# Patient Record
Sex: MALE | Race: WHITE | NOT HISPANIC OR LATINO
[De-identification: names, ages, dates, MRNs, and addresses within clinical notes are randomized per-mention and may not be internally consistent; named-entity substitution may affect disease eponyms.]

---

## 2020-05-28 ENCOUNTER — TRANSCRIPTION ENCOUNTER (OUTPATIENT)
Age: 40
End: 2020-05-28

## 2020-08-19 ENCOUNTER — TRANSCRIPTION ENCOUNTER (OUTPATIENT)
Age: 40
End: 2020-08-19

## 2021-07-30 ENCOUNTER — EMERGENCY (EMERGENCY)
Facility: HOSPITAL | Age: 41
LOS: 1 days | Discharge: HOME | End: 2021-07-30
Attending: EMERGENCY MEDICINE | Admitting: EMERGENCY MEDICINE
Payer: COMMERCIAL

## 2021-07-30 VITALS
SYSTOLIC BLOOD PRESSURE: 104 MMHG | DIASTOLIC BLOOD PRESSURE: 70 MMHG | HEART RATE: 86 BPM | RESPIRATION RATE: 18 BRPM | OXYGEN SATURATION: 100 %

## 2021-07-30 VITALS
OXYGEN SATURATION: 100 % | DIASTOLIC BLOOD PRESSURE: 79 MMHG | SYSTOLIC BLOOD PRESSURE: 117 MMHG | HEART RATE: 107 BPM | TEMPERATURE: 98 F | RESPIRATION RATE: 20 BRPM

## 2021-07-30 LAB
ALBUMIN SERPL ELPH-MCNC: 4.5 G/DL — SIGNIFICANT CHANGE UP (ref 3.3–5)
ALP SERPL-CCNC: 71 U/L — SIGNIFICANT CHANGE UP (ref 40–120)
ALT FLD-CCNC: 6 U/L — SIGNIFICANT CHANGE UP (ref 4–41)
ANION GAP SERPL CALC-SCNC: 14 MMOL/L — SIGNIFICANT CHANGE UP (ref 7–14)
AST SERPL-CCNC: 17 U/L — SIGNIFICANT CHANGE UP (ref 4–40)
BASOPHILS # BLD AUTO: 0.02 K/UL — SIGNIFICANT CHANGE UP (ref 0–0.2)
BASOPHILS NFR BLD AUTO: 0.2 % — SIGNIFICANT CHANGE UP (ref 0–2)
BILIRUB SERPL-MCNC: 0.3 MG/DL — SIGNIFICANT CHANGE UP (ref 0.2–1.2)
BUN SERPL-MCNC: 14 MG/DL — SIGNIFICANT CHANGE UP (ref 7–23)
CALCIUM SERPL-MCNC: 9.3 MG/DL — SIGNIFICANT CHANGE UP (ref 8.4–10.5)
CHLORIDE SERPL-SCNC: 103 MMOL/L — SIGNIFICANT CHANGE UP (ref 98–107)
CO2 SERPL-SCNC: 21 MMOL/L — LOW (ref 22–31)
CREAT SERPL-MCNC: 1.11 MG/DL — SIGNIFICANT CHANGE UP (ref 0.5–1.3)
EOSINOPHIL # BLD AUTO: 0.03 K/UL — SIGNIFICANT CHANGE UP (ref 0–0.5)
EOSINOPHIL NFR BLD AUTO: 0.2 % — SIGNIFICANT CHANGE UP (ref 0–6)
GLUCOSE SERPL-MCNC: 124 MG/DL — HIGH (ref 70–99)
HCT VFR BLD CALC: 43 % — SIGNIFICANT CHANGE UP (ref 39–50)
HGB BLD-MCNC: 14.1 G/DL — SIGNIFICANT CHANGE UP (ref 13–17)
IANC: 11.02 K/UL — HIGH (ref 1.5–8.5)
IMM GRANULOCYTES NFR BLD AUTO: 0.5 % — SIGNIFICANT CHANGE UP (ref 0–1.5)
LIDOCAIN IGE QN: 52 U/L — SIGNIFICANT CHANGE UP (ref 7–60)
LYMPHOCYTES # BLD AUTO: 0.66 K/UL — LOW (ref 1–3.3)
LYMPHOCYTES # BLD AUTO: 5.3 % — LOW (ref 13–44)
MCHC RBC-ENTMCNC: 29.3 PG — SIGNIFICANT CHANGE UP (ref 27–34)
MCHC RBC-ENTMCNC: 32.8 GM/DL — SIGNIFICANT CHANGE UP (ref 32–36)
MCV RBC AUTO: 89.2 FL — SIGNIFICANT CHANGE UP (ref 80–100)
MONOCYTES # BLD AUTO: 0.73 K/UL — SIGNIFICANT CHANGE UP (ref 0–0.9)
MONOCYTES NFR BLD AUTO: 5.8 % — SIGNIFICANT CHANGE UP (ref 2–14)
NEUTROPHILS # BLD AUTO: 11.02 K/UL — HIGH (ref 1.8–7.4)
NEUTROPHILS NFR BLD AUTO: 88 % — HIGH (ref 43–77)
NRBC # BLD: 0 /100 WBCS — SIGNIFICANT CHANGE UP
NRBC # FLD: 0 K/UL — SIGNIFICANT CHANGE UP
PLATELET # BLD AUTO: 196 K/UL — SIGNIFICANT CHANGE UP (ref 150–400)
POTASSIUM SERPL-MCNC: 4.3 MMOL/L — SIGNIFICANT CHANGE UP (ref 3.5–5.3)
POTASSIUM SERPL-SCNC: 4.3 MMOL/L — SIGNIFICANT CHANGE UP (ref 3.5–5.3)
PROT SERPL-MCNC: 6.8 G/DL — SIGNIFICANT CHANGE UP (ref 6–8.3)
RBC # BLD: 4.82 M/UL — SIGNIFICANT CHANGE UP (ref 4.2–5.8)
RBC # FLD: 12.7 % — SIGNIFICANT CHANGE UP (ref 10.3–14.5)
SODIUM SERPL-SCNC: 138 MMOL/L — SIGNIFICANT CHANGE UP (ref 135–145)
WBC # BLD: 12.52 K/UL — HIGH (ref 3.8–10.5)
WBC # FLD AUTO: 12.52 K/UL — HIGH (ref 3.8–10.5)

## 2021-07-30 PROCEDURE — 71045 X-RAY EXAM CHEST 1 VIEW: CPT | Mod: 26

## 2021-07-30 PROCEDURE — 99284 EMERGENCY DEPT VISIT MOD MDM: CPT

## 2021-07-30 RX ORDER — MORPHINE SULFATE 50 MG/1
4 CAPSULE, EXTENDED RELEASE ORAL ONCE
Refills: 0 | Status: DISCONTINUED | OUTPATIENT
Start: 2021-07-30 | End: 2021-07-30

## 2021-07-30 RX ORDER — SODIUM CHLORIDE 9 MG/ML
1000 INJECTION INTRAMUSCULAR; INTRAVENOUS; SUBCUTANEOUS ONCE
Refills: 0 | Status: COMPLETED | OUTPATIENT
Start: 2021-07-30 | End: 2021-07-30

## 2021-07-30 RX ORDER — FAMOTIDINE 10 MG/ML
20 INJECTION INTRAVENOUS ONCE
Refills: 0 | Status: COMPLETED | OUTPATIENT
Start: 2021-07-30 | End: 2021-07-30

## 2021-07-30 RX ORDER — KETOROLAC TROMETHAMINE 30 MG/ML
15 SYRINGE (ML) INJECTION ONCE
Refills: 0 | Status: DISCONTINUED | OUTPATIENT
Start: 2021-07-30 | End: 2021-07-30

## 2021-07-30 RX ADMIN — SODIUM CHLORIDE 1000 MILLILITER(S): 9 INJECTION INTRAMUSCULAR; INTRAVENOUS; SUBCUTANEOUS at 06:51

## 2021-07-30 RX ADMIN — FAMOTIDINE 20 MILLIGRAM(S): 10 INJECTION INTRAVENOUS at 06:51

## 2021-07-30 RX ADMIN — Medication 15 MILLIGRAM(S): at 07:03

## 2021-07-30 NOTE — ED ADULT NURSE NOTE - NSHOSCREENINGQ1_ED_ALL_ED
No Spoke with jazmine re: increase in medication & 6-1-20 Appt.  Jazmine will try to get in touch with the pt.    Sent pt a msg via portal also.

## 2021-07-30 NOTE — ED PROVIDER NOTE - CLINICAL SUMMARY MEDICAL DECISION MAKING FREE TEXT BOX
40M presenting with sharp abdominal pain. On exam, tachy and uncomfortable appearing. epigastric/luq pain. Possible onset of gastroenteritis, will assess for other abdominal pathology on labs and upright CXR. Plan: Labs, imaging, urine, reassess. Leidy Mobley, VALERY PGY3

## 2021-07-30 NOTE — ED PROVIDER NOTE - NS ED ROS FT
CONST: no fevers, no chills, no trauma  EYES: no pain, no blurry vision   ENT: no sore throat, no epistaxis, no rhinorrhea  CV: no chest pain, no palpitations, no orthopnea, no extremity pain or swelling  RESP: no shortness of breath, no cough, no sputum, no pleurisy, no wheezing  ABD: + abdominal pain, + nausea, +vomiting, + diarrhea, no black or bloody stool  : no dysuria, no hematuria, no frequency, no urgency  MSK: no back pain, no neck pain, no extremity pain  NEURO: no headache, no sensory disturbances, no focal weakness, no dizziness  HEME: no easy bleeding or bruising  SKIN: no diaphoresis, no rash

## 2021-07-30 NOTE — ED ADULT NURSE NOTE - OBJECTIVE STATEMENT
41yo male received in trauma room B. pt A&Ox3, denies pmhx, c/o pain to mid-abdomen since 10pm last night. last po intake 9pm. Abdomen soft, non-distended. Last BM yesterday morning. Denies nauseas, vomiting, and diarrhea at present. Denies hemauria and dysuria. Respiration even and non-labored. in nad. Side rails up, bed at lowest position, call bell within reach, patient oriented to the unit, safety maintained. MD anand in progress.

## 2021-07-30 NOTE — ED ADULT NURSE REASSESSMENT NOTE - NS ED NURSE REASSESS COMMENT FT1
Report received night RN. Pt states he is feeling much better. Denies abd pain, nausea, SOB, CP, dizziness. Will continue to monitor.

## 2021-07-30 NOTE — ED PROVIDER NOTE - OBJECTIVE STATEMENT
40M with no significant pmhx presenting with CC of periumbilical abdominal pain that started last night after eating food (schwarma) from a truck. Pain is sharp/"shooting" and intermittent since. +loose stools and nausea vomiting. Had pain like this about one month ago, unsure what triggered it but resolved on its own. Denies flank pain, hx of kidney stones, urinary symptoms, testicular pain. No hx of abdominal surgeries.

## 2021-07-30 NOTE — ED ADULT TRIAGE NOTE - CHIEF COMPLAINT QUOTE
Mid abdominal pain all night. vomited once this morning. Denies fever or diarrhea. Pt appears uncomfortable. No PMH

## 2021-07-30 NOTE — ED PROVIDER NOTE - PHYSICAL EXAMINATION
Const: Well-nourished, Well-developed, appearing stated age.  Eyes: no conjunctival injection, and symmetrical lids.  HEENT: Head NCAT, no lesions. Atraumatic external nose and ears. Moist MM.  Neck: Symmetric, trachea midline.   CVS: +S1/S2, Peripheral pulses 2+ and equal in all extremities.  RESP: Unlabored respiratory effort. Clear to auscultation bilaterally.  GI: Minimally tender in LUQ and epigastric area, rest of abdomen is soft Nontender/Nondistended, No CVA tenderness b/l.   MSK: Normocephalic/Atraumatic, Lower Extremities w/o calf tenderness or edema b/l.   Skin: Warm, dry and intact.   Neuro: CNs II-XII grossly intact. Motor & Sensation grossly intact.  Psych: Awake, Alert, & Oriented (AAO) x3. Appropriate mood and affect.

## 2021-07-30 NOTE — ED PROVIDER NOTE - PATIENT PORTAL LINK FT
You can access the FollowMyHealth Patient Portal offered by [facility] by registering at the following website: [email or web address]. By joining [facility]’s FollowMyHealth portal, you will also be able to view your health information using other applications (apps) compatible with our system.

## 2021-07-30 NOTE — ED PROVIDER NOTE - PROGRESS NOTE DETAILS
reports improvement. VS WNL. Reports improvement. Dr. Renee: Pt was signed out to me for re-eval given pt has improvement in symptoms. Currently pain free. No tenderness on exam. Tolerating PO. Given strict return precautions.

## 2021-07-30 NOTE — ED PROVIDER NOTE - NSFOLLOWUPINSTRUCTIONS_ED_ALL_ED_FT
You were seen in the ED for abdominal pain.   The following labs/imaging were obtained: see attached (if applicable)  Continue home medications (if any).   Take omeprazole 20mg OTC 1 hr prior to first meal of the day for 14 days.   Return to the ED if you develop fever, chest pain, shortness of breath,  worsening or new concerning symptoms.  Follow up with your primary care in 2-3 days.   Discussed with pt results of work up, strict return precautions, and need for follow up.  Pt expressed understanding and agrees with plan.

## 2021-07-31 ENCOUNTER — EMERGENCY (EMERGENCY)
Facility: HOSPITAL | Age: 41
LOS: 1 days | Discharge: HOME | End: 2021-07-31
Admitting: EMERGENCY MEDICINE
Payer: COMMERCIAL

## 2021-07-31 VITALS
RESPIRATION RATE: 18 BRPM | DIASTOLIC BLOOD PRESSURE: 59 MMHG | SYSTOLIC BLOOD PRESSURE: 110 MMHG | OXYGEN SATURATION: 100 % | HEART RATE: 88 BPM | TEMPERATURE: 98 F

## 2021-07-31 PROCEDURE — 74177 CT ABD & PELVIS W/CONTRAST: CPT | Mod: 26

## 2021-07-31 PROCEDURE — 99285 EMERGENCY DEPT VISIT HI MDM: CPT

## 2021-07-31 RX ORDER — FAMOTIDINE 10 MG/ML
20 INJECTION INTRAVENOUS ONCE
Refills: 0 | Status: COMPLETED | OUTPATIENT
Start: 2021-07-31 | End: 2021-07-31

## 2021-07-31 RX ORDER — SODIUM CHLORIDE 9 MG/ML
1000 INJECTION INTRAMUSCULAR; INTRAVENOUS; SUBCUTANEOUS ONCE
Refills: 0 | Status: COMPLETED | OUTPATIENT
Start: 2021-07-31 | End: 2021-07-31

## 2021-07-31 RX ADMIN — SODIUM CHLORIDE 1000 MILLILITER(S): 9 INJECTION INTRAMUSCULAR; INTRAVENOUS; SUBCUTANEOUS at 23:51

## 2021-07-31 RX ADMIN — FAMOTIDINE 20 MILLIGRAM(S): 10 INJECTION INTRAVENOUS at 23:51

## 2021-07-31 NOTE — ED ADULT NURSE NOTE - OBJECTIVE STATEMENT
Received patient to intake for abdominal pain. A&o4,ambulatory, c/o abdominal pain x2 days with diarrhea and vomiting. Abdomen soft nondistended, not actively vomiting at this time. RR even and unlabored, left 20G AC placed, labs sent.

## 2021-07-31 NOTE — ED PROVIDER NOTE - CLINICAL SUMMARY MEDICAL DECISION MAKING FREE TEXT BOX
this is a 40 y.o male with no PMhx presented to the ED complaining of having periumbilical pain for the past 48 hours. Patient states that the pain started thursday night, sharp in nature and intermittent. abdominal pain- r/o appendicitis-labs, fluids, meds reassess

## 2021-07-31 NOTE — ED ADULT NURSE NOTE - NSIMPLEMENTINTERV_GEN_ALL_ED
Implemented All Universal Safety Interventions:  [facility] to call system. Call bell, personal items and telephone within reach. Instruct patient to call for assistance. Room bathroom lighting operational. Non-slip footwear when patient is off stretcher. Physically safe environment: no spills, clutter or unnecessary equipment. Stretcher in lowest position, wheels locked, appropriate side rails in place.

## 2021-07-31 NOTE — ED PROVIDER NOTE - PATIENT PORTAL LINK FT
You can access the FollowMyHealth Patient Portal offered by [facility] by registering at the following website: [email or web address]. By joining [facility]’s FollowMyHealth portal, you will also be able to view your health information using other applications (apps) compatible with our system.

## 2021-07-31 NOTE — ED PROVIDER NOTE - OBJECTIVE STATEMENT
this is a 40 y.o male with no PMhx presented to the ED complaining of having periumbilical pain for the past 48 hours. Patient states that the pain started thursday night, sharp in nature and intermittent. patient states that ibuprofen improves the pain, he admits to having a episode of vomiting, and 1 episode of diarrhea. patient was here yesterday however did not have a CT scan, and was given medication which made the pain worse. Patient denies having any CP, SOB, JERNIGAN, headaches, dizziness, fever, or chills. patient states that he hasn't been able to eat much.

## 2021-07-31 NOTE — ED ADULT TRIAGE NOTE - CHIEF COMPLAINT QUOTE
abd pain    pt was seen 2 days ago for same complaint... generalized abd pain, vomiting x1, diarrhea x2.  states was feeling better but now worse again.

## 2021-08-01 VITALS
DIASTOLIC BLOOD PRESSURE: 53 MMHG | OXYGEN SATURATION: 100 % | RESPIRATION RATE: 16 BRPM | SYSTOLIC BLOOD PRESSURE: 115 MMHG | TEMPERATURE: 98 F | HEART RATE: 67 BPM

## 2021-08-01 LAB
ALBUMIN SERPL ELPH-MCNC: 3.9 G/DL — SIGNIFICANT CHANGE UP (ref 3.3–5)
ALP SERPL-CCNC: 60 U/L — SIGNIFICANT CHANGE UP (ref 40–120)
ALT FLD-CCNC: 7 U/L — SIGNIFICANT CHANGE UP (ref 4–41)
ANION GAP SERPL CALC-SCNC: 13 MMOL/L — SIGNIFICANT CHANGE UP (ref 7–14)
AST SERPL-CCNC: 20 U/L — SIGNIFICANT CHANGE UP (ref 4–40)
BASOPHILS # BLD AUTO: 0.02 K/UL — SIGNIFICANT CHANGE UP (ref 0–0.2)
BASOPHILS NFR BLD AUTO: 0.2 % — SIGNIFICANT CHANGE UP (ref 0–2)
BILIRUB SERPL-MCNC: 0.3 MG/DL — SIGNIFICANT CHANGE UP (ref 0.2–1.2)
BUN SERPL-MCNC: 10 MG/DL — SIGNIFICANT CHANGE UP (ref 7–23)
CALCIUM SERPL-MCNC: 8.9 MG/DL — SIGNIFICANT CHANGE UP (ref 8.4–10.5)
CHLORIDE SERPL-SCNC: 104 MMOL/L — SIGNIFICANT CHANGE UP (ref 98–107)
CO2 SERPL-SCNC: 21 MMOL/L — LOW (ref 22–31)
CREAT SERPL-MCNC: 0.89 MG/DL — SIGNIFICANT CHANGE UP (ref 0.5–1.3)
EOSINOPHIL # BLD AUTO: 0.04 K/UL — SIGNIFICANT CHANGE UP (ref 0–0.5)
EOSINOPHIL NFR BLD AUTO: 0.5 % — SIGNIFICANT CHANGE UP (ref 0–6)
GLUCOSE SERPL-MCNC: 113 MG/DL — HIGH (ref 70–99)
HCT VFR BLD CALC: 39.4 % — SIGNIFICANT CHANGE UP (ref 39–50)
HGB BLD-MCNC: 13 G/DL — SIGNIFICANT CHANGE UP (ref 13–17)
IANC: 6.18 K/UL — SIGNIFICANT CHANGE UP (ref 1.5–8.5)
IMM GRANULOCYTES NFR BLD AUTO: 0.1 % — SIGNIFICANT CHANGE UP (ref 0–1.5)
LYMPHOCYTES # BLD AUTO: 1.14 K/UL — SIGNIFICANT CHANGE UP (ref 1–3.3)
LYMPHOCYTES # BLD AUTO: 13.6 % — SIGNIFICANT CHANGE UP (ref 13–44)
MCHC RBC-ENTMCNC: 29.5 PG — SIGNIFICANT CHANGE UP (ref 27–34)
MCHC RBC-ENTMCNC: 33 GM/DL — SIGNIFICANT CHANGE UP (ref 32–36)
MCV RBC AUTO: 89.3 FL — SIGNIFICANT CHANGE UP (ref 80–100)
MONOCYTES # BLD AUTO: 0.98 K/UL — HIGH (ref 0–0.9)
MONOCYTES NFR BLD AUTO: 11.7 % — SIGNIFICANT CHANGE UP (ref 2–14)
NEUTROPHILS # BLD AUTO: 6.18 K/UL — SIGNIFICANT CHANGE UP (ref 1.8–7.4)
NEUTROPHILS NFR BLD AUTO: 73.9 % — SIGNIFICANT CHANGE UP (ref 43–77)
NRBC # BLD: 0 /100 WBCS — SIGNIFICANT CHANGE UP
NRBC # FLD: 0 K/UL — SIGNIFICANT CHANGE UP
PLATELET # BLD AUTO: 192 K/UL — SIGNIFICANT CHANGE UP (ref 150–400)
POTASSIUM SERPL-MCNC: 4 MMOL/L — SIGNIFICANT CHANGE UP (ref 3.5–5.3)
POTASSIUM SERPL-SCNC: 4 MMOL/L — SIGNIFICANT CHANGE UP (ref 3.5–5.3)
PROT SERPL-MCNC: 6.7 G/DL — SIGNIFICANT CHANGE UP (ref 6–8.3)
RBC # BLD: 4.41 M/UL — SIGNIFICANT CHANGE UP (ref 4.2–5.8)
RBC # FLD: 12.7 % — SIGNIFICANT CHANGE UP (ref 10.3–14.5)
SODIUM SERPL-SCNC: 138 MMOL/L — SIGNIFICANT CHANGE UP (ref 135–145)
WBC # BLD: 8.37 K/UL — SIGNIFICANT CHANGE UP (ref 3.8–10.5)
WBC # FLD AUTO: 8.37 K/UL — SIGNIFICANT CHANGE UP (ref 3.8–10.5)

## 2021-08-01 RX ADMIN — Medication 1 TABLET(S): at 00:39

## 2022-01-08 ENCOUNTER — EMERGENCY (EMERGENCY)
Facility: HOSPITAL | Age: 42
LOS: 1 days | Discharge: HOME | End: 2022-01-08
Attending: EMERGENCY MEDICINE
Payer: COMMERCIAL

## 2022-01-08 VITALS
HEART RATE: 102 BPM | RESPIRATION RATE: 17 BRPM | OXYGEN SATURATION: 97 % | SYSTOLIC BLOOD PRESSURE: 127 MMHG | WEIGHT: 190.04 LBS | DIASTOLIC BLOOD PRESSURE: 83 MMHG | TEMPERATURE: 99 F

## 2022-01-08 VITALS
OXYGEN SATURATION: 99 % | TEMPERATURE: 98 F | SYSTOLIC BLOOD PRESSURE: 126 MMHG | RESPIRATION RATE: 16 BRPM | HEART RATE: 86 BPM | DIASTOLIC BLOOD PRESSURE: 84 MMHG

## 2022-01-08 PROCEDURE — 29515 APPLICATION SHORT LEG SPLINT: CPT | Mod: LT

## 2022-01-08 PROCEDURE — 29515 APPLICATION SHORT LEG SPLINT: CPT

## 2022-01-08 PROCEDURE — 73590 X-RAY EXAM OF LOWER LEG: CPT | Mod: 26,77,LT

## 2022-01-08 PROCEDURE — 73600 X-RAY EXAM OF ANKLE: CPT

## 2022-01-08 PROCEDURE — 73610 X-RAY EXAM OF ANKLE: CPT | Mod: 26,LT,76

## 2022-01-08 PROCEDURE — 73620 X-RAY EXAM OF FOOT: CPT | Mod: 26,LT

## 2022-01-08 PROCEDURE — 73590 X-RAY EXAM OF LOWER LEG: CPT | Mod: 26,LT

## 2022-01-08 PROCEDURE — 99284 EMERGENCY DEPT VISIT MOD MDM: CPT | Mod: 25

## 2022-01-08 PROCEDURE — 73610 X-RAY EXAM OF ANKLE: CPT

## 2022-01-08 PROCEDURE — 73590 X-RAY EXAM OF LOWER LEG: CPT

## 2022-01-08 PROCEDURE — 73620 X-RAY EXAM OF FOOT: CPT

## 2022-01-08 NOTE — ED PROVIDER NOTE - PROGRESS NOTE DETAILS
Cory pgy1: distal fibula fx seen on xray will splint with ortho f.u Isidro: Patient seen by ortho for stress views of distal fibula. shows widening of space. ortho splinted. will repeat post splint xray, d/c home to f/u outpatient.

## 2022-01-08 NOTE — ED ADULT NURSE NOTE - OBJECTIVE STATEMENT
42 yo M pt p/w simple mechanical slip and fall on ice today. pt c/o L ankle pain and swelling as well as L knee pain. pt is A&Ox4, MAEW, L ankle ROM limited D/T pain, L ankle minimally swollen, DP pulses intact and equal b/l, L knee FROM, no erythema noted to L ankle/knee, overlying skin intact.  pt endorses walking on his foot for the entire day after injury but pain worsened over the past few hours.  Pt denies: trauma other than stated, syncope, LOC, headache, dizziness, chest pain, palpitations, cough, SOB, abdominal pain, n/v/d, urinary symptoms, fevers, chills, weakness at this time.

## 2022-01-08 NOTE — ED PROVIDER NOTE - NSFOLLOWUPINSTRUCTIONS_ED_ALL_ED_FT
Follow up with Orthopedist Dr. Lyons.  Text him.   Do not wet splint. Keep the splint elevated.   Take ibuprofen 600 mg by mouth every 6 hours as needed for pain.   Return to the ER immediately for worsening symptoms.

## 2022-01-08 NOTE — ED PROVIDER NOTE - CLINICAL SUMMARY MEDICAL DECISION MAKING FREE TEXT BOX
40 yo m no pmhx vss distal pulses intact <2 s cap refill swelling and tenderness to later aspect of distal leg no malleoli tenderness no midfoot tenderness. Concern for fx vs sprain vs dislocation will get x ray and give analgesia. 42 yo m no pmhx vss distal pulses intact <2 s cap refill swelling and tenderness to later aspect of distal leg no malleoli tenderness no midfoot tenderness. Concern for fx vs sprain vs dislocation will get x ray and give analgesia.    Isidro: 41 year old male with left distal fibula pain and swelling. + ttp, + swelling. no knee tenderness, no knee swelling. FROM of left knee. + 2 dp b/ lle. skin intact.

## 2022-01-08 NOTE — ED PROVIDER NOTE - PHYSICAL EXAMINATION
Gen: NAD, non-toxic appearing  Head: normal appearing  HEENT: normal conjunctiva, oral mucosa moist  Lung: no respiratory distress, speaking in full sentences, CTA b/l     CV: regular rate and rhythm, no murmurs distal pulses intact <2 s cap refill  Abd: soft, non distended, non tender   MSK: no visible deformities swelling and tenderness to later aspect of distal leg no malleoli tenderness no midfoot tenderness.    Neuro: No focal deficits, AAOx3 full sensation all ext  Skin: Warm  Psych: normal affect

## 2022-01-08 NOTE — ED PROVIDER NOTE - OBJECTIVE STATEMENT
42 yo M p/w ankle pain s/p fall. Pt was walking to get mail down drive way slipped on ice twisting ankle. Fell on hit buttocks. Did not hit head did not have loc. not on blood thinners. Was unable to ambulate after incident. Unable to bare weight. Denies loss of sensation.

## 2022-01-08 NOTE — ED PROVIDER NOTE - PATIENT PORTAL LINK FT
You can access the FollowMyHealth Patient Portal offered by [facility] by registering at the following website: [email or web address]. By joining [employer]’s FollowMyHealth portal, you will also be able to view your health information using other applications (apps) compatible with our system.

## 2022-01-09 NOTE — CONSULT NOTE ADULT - SUBJECTIVE AND OBJECTIVE BOX
41yMale c/o left ankle pain s/p mechanical fall. Denies headstrike/LOC. Denies any other trauma or injuries. Denies numbness/tingling. Community ambulator at baseline.    PAST MEDICAL & SURGICAL HISTORY:  No pertinent past medical history    No significant past surgical history      Home Medications:    Allergies    No Known Allergies    Intolerances        Vital Signs Last 24 Hrs  T(C): 36.6 (08 Jan 2022 22:30), Max: 37.1 (08 Jan 2022 18:32)  T(F): 97.8 (08 Jan 2022 22:30), Max: 98.8 (08 Jan 2022 18:32)  HR: 86 (08 Jan 2022 22:30) (86 - 102)  BP: 126/84 (08 Jan 2022 22:30) (126/84 - 127/83)  BP(mean): --  RR: 16 (08 Jan 2022 22:30) (16 - 17)  SpO2: 99% (08 Jan 2022 22:30) (97% - 99%)    Physical Exam  Gen: NAD, resting comfortably  LLE  Skin intact  TTP over ankle  ROM limited due to pain  +TA/EHL/FHL/GS  SILT L2-S1  DP/PT 2+  Compartments soft and compressible    RUE: No bony tenderness or deformity, skin intact  LUE: No bony tenderness or deformity, skin intact  RLE: No bony tenderness or deformity, skin intact  Spine: No tenderness or stepoffs, skin intact                    Imaging:  XR L Ankle: Showing Hitchcock B ankle fracture  Stress View Ankle: pos medial clear space widening    Procedure: Patient advised of need for closed reduction of fracture, patient verbalized understanding and consented to the procedure. Patient neurovascularly intact pre-procedure. Closed reduction performed followed by placement of a well padded trilam splint. Patient tolerated the procedure well. Post procedure imaging obtained and showed improved alignment. Post procedure the patient was NV intact.    A/P: 41yMale with L ankle fracture s/p closed reduction and splinting    - Pain control  - NWB L LE in splint  - Keep splint clean, dry and intact until follow up  - Rest ice elevate  - [id number] daily  - Follow up with Dr. Lyons in office, please call for appointment tomorrow  - all questions answered

## 2022-01-10 ENCOUNTER — APPOINTMENT (OUTPATIENT)
Dept: ORTHOPEDIC SURGERY | Facility: CLINIC | Age: 42
End: 2022-01-10
Payer: COMMERCIAL

## 2022-01-10 ENCOUNTER — NON-APPOINTMENT (OUTPATIENT)
Age: 42
End: 2022-01-10

## 2022-01-10 DIAGNOSIS — S82.892A OTHER FRACTURE OF LEFT LOWER LEG, INITIAL ENCOUNTER FOR CLOSED FRACTURE: ICD-10-CM

## 2022-01-10 PROBLEM — Z00.00 ENCOUNTER FOR PREVENTIVE HEALTH EXAMINATION: Status: ACTIVE | Noted: 2022-01-10

## 2022-01-10 PROCEDURE — 99442: CPT

## 2022-01-12 ENCOUNTER — TRANSCRIPTION ENCOUNTER (OUTPATIENT)
Age: 42
End: 2022-01-12

## 2022-05-25 NOTE — ED PROVIDER NOTE - NS_EDPROVIDERDISPOUSERTYPE_ED_A_ED
Tony Zaragoza,   Will you take a look at this clinic encounter and call me at [phone] to discuss.  Thank you,   ~Nataliya 
I have personally evaluated and examined the patient. The Attending was available to me as a supervising provider if needed.

## 2023-05-02 NOTE — ED ADULT NURSE NOTE - NSHOSCREENINGQ1_ED_ALL_ED
No Double Z Plasty Text: The lesion was extirpated to the level of the fat with a #15 scalpel blade. Given the location of the defect, shape of the defect and the proximity to free margins a double Z-plasty was deemed most appropriate for repair. Using a sterile surgical marker, the appropriate transposition arms of the double Z-plasty were drawn incorporating the defect and placing the expected incisions within the relaxed skin tension lines where possible. The area thus outlined was incised deep to adipose tissue with a #15 scalpel blade. The skin margins were undermined to an appropriate distance in all directions utilizing iris scissors. The opposing transposition arms were then transposed and carried over into place in opposite direction and anchored with interrupted buried subcutaneous sutures.

## 2023-10-01 NOTE — ED ADULT NURSE NOTE - DATE OF LAST VACCINATION
Medicaid cab for pt @ 03:30 with an ETA of 1-3 hours. Ride # [phone].     [employer] medicaid; [phone]     Dayna Taveras RN  10/01/23 4535 30-Mar-2021

## 2024-02-09 ENCOUNTER — APPOINTMENT (OUTPATIENT)
Dept: RADIOLOGY | Facility: HOSPITAL | Age: 44
End: 2024-02-09
Payer: COMMERCIAL

## 2024-02-09 ENCOUNTER — APPOINTMENT (OUTPATIENT)
Dept: PULMONOLOGY | Facility: CLINIC | Age: 44
End: 2024-02-09
Payer: COMMERCIAL

## 2024-02-09 ENCOUNTER — OUTPATIENT (OUTPATIENT)
Dept: OUTPATIENT SERVICES | Facility: HOSPITAL | Age: 44
LOS: 1 days | End: 2024-02-09
Payer: COMMERCIAL

## 2024-02-09 VITALS
BODY MASS INDEX: 24.38 KG/M2 | HEIGHT: 72 IN | TEMPERATURE: 97.5 F | HEART RATE: 66 BPM | DIASTOLIC BLOOD PRESSURE: 70 MMHG | OXYGEN SATURATION: 100 % | SYSTOLIC BLOOD PRESSURE: 120 MMHG | WEIGHT: 180 LBS

## 2024-02-09 DIAGNOSIS — Z84.89 FAMILY HISTORY OF OTHER SPECIFIED CONDITIONS: ICD-10-CM

## 2024-02-09 DIAGNOSIS — K42.9 UMBILICAL HERNIA W/OUT OBSTRUCTION OR GANGRENE: ICD-10-CM

## 2024-02-09 DIAGNOSIS — Z83.511 FAMILY HISTORY OF GLAUCOMA: ICD-10-CM

## 2024-02-09 DIAGNOSIS — J45.909 UNSPECIFIED ASTHMA, UNCOMPLICATED: ICD-10-CM

## 2024-02-09 DIAGNOSIS — K40.20 BILATERAL INGUINAL HERNIA, W/OUT OBSTRUCTION OR GANGRENE, NOT SPECIFIED AS RECURRENT: ICD-10-CM

## 2024-02-09 DIAGNOSIS — Z78.9 OTHER SPECIFIED HEALTH STATUS: ICD-10-CM

## 2024-02-09 DIAGNOSIS — K52.9 NONINFECTIVE GASTROENTERITIS AND COLITIS, UNSPECIFIED: ICD-10-CM

## 2024-02-09 DIAGNOSIS — R94.2 ABNORMAL RESULTS OF PULMONARY FUNCTION STUDIES: ICD-10-CM

## 2024-02-09 PROCEDURE — 99204 OFFICE O/P NEW MOD 45 MIN: CPT

## 2024-02-09 PROCEDURE — 71046 X-RAY EXAM CHEST 2 VIEWS: CPT | Mod: 26

## 2024-02-09 PROCEDURE — 71046 X-RAY EXAM CHEST 2 VIEWS: CPT

## 2024-02-09 RX ORDER — ALBUTEROL SULFATE 90 UG/1
108 (90 BASE) AEROSOL, METERED RESPIRATORY (INHALATION)
Qty: 1 | Refills: 6 | Status: ACTIVE | COMMUNITY
Start: 2024-02-09 | End: 1900-01-01

## 2024-02-09 NOTE — ASSESSMENT
[FreeTextEntry1] : 44y/o  male never smoker  1-  abnormal PFT with  mild restrictive ventilatory defect with  h/o COVID  wheezing 2- eczema hx  /deviated septum /     ?  asthma 3- vaccination per primary   recommendations 1- ENT referral  for septum / inspection  2- cxr  performed today  normal    will  get  HRCT    3- albuterol MDI  prior to exercise   2 inh  q  6hour prn  4- FENO    and  PFT full  lung volumes  return after above counseling and agreement on plan

## 2024-02-09 NOTE — REVIEW OF SYSTEMS
[Fever] : no fever [Fatigue] : no fatigue [Recent Wt Gain (___ Lbs)] : ~T no recent weight gain [Chills] : no chills [Poor Appetite] : no poor appetite [Recent Wt Loss (___ Lbs)] : ~T no recent weight loss [Dry Eyes] : no dry eyes [Epistaxis] : no epistaxis [Sore Throat] : no sore throat [Postnasal Drip] : no postnasal drip [Dry Mouth] : no dry mouth [Sinus Problems] : no sinus problems [Cough] : no cough [Hemoptysis] : no hemoptysis [Sputum] : no sputum [Dyspnea] : no dyspnea [Wheezing] : no wheezing [SOB on Exertion] : no sob on exertion [Chest Discomfort] : no chest discomfort [Palpitations] : no palpitations [GERD] : no gerd [Abdominal Pain] : no abdominal pain [Nausea] : no nausea [Vomiting] : no vomiting [Dysphagia] : no dysphagia [Bleeding] : no bleeding [Rash] : no rash [Blood Transfusion] : no blood transfusion [Clotting Disorder/ Frequent bleeding] : no clotting disorder/ frequent bleeding [Diabetes] : no diabetes [Thyroid Problem] : no thyroid problem [Obesity] : no obesity [TextBox_101] : eczema

## 2024-02-09 NOTE — HISTORY OF PRESENT ILLNESS
[Never] : never [TextBox_4] : 2/9/2024 42y/o male  born in [place]   -  never smoker  work    investment  banker  -   no fumes exposure  -   first  visit for abnormal PFT / wheezing - had   preventative   health  spirometry    in chart   with mild restriction  no full  lung volumes performed - h/o eczema - has noted wheezing when  exercising    and  playing hockey at times - no chest pain  no    sob -doing well otherwise -was told  has a deviated septum  -no  nasal issues  currently  -

## 2024-02-09 NOTE — PHYSICAL EXAM
[Low Lying Soft Palate] : low lying soft palate [III] : Mallampati Class: III [Normal Appearance] : normal appearance [Supple] : supple [No Neck Mass] : no neck mass [No JVD] : no jvd [Normal Rate/Rhythm] : normal rate/rhythm [Normal S1, S2] : normal s1, s2 [No Murmurs] : no murmurs [No Resp Distress] : no resp distress [No Acc Muscle Use] : no acc muscle use [Clear to Auscultation Bilaterally] : clear to auscultation bilaterally [Benign] : benign [Not Tender] : not tender [Normal Gait] : normal gait [No Clubbing] : no clubbing [No Edema] : no edema [No Motor Deficits] : no motor deficits [Normal Affect] : normal affect [TextBox_2] : pleasant male no distress speaking full sentences no cough  [TextBox_11] : crowded no  lesion    moist  [TextBox_125] : wrist   area of eczema

## 2024-02-09 NOTE — PROCEDURE
[FreeTextEntry1] : chart reviewed  1/24  spirometry only      FVC 5.6L (70%)     FEV1 4.28L( 77%)      R normal    fef 25-75% normal   cxr 2021 portable  poor insp effort  read as normal  ct abd 2021 for   abd pain after travel  lung  bases clear   hernia noted    colitis

## 2024-02-23 ENCOUNTER — OUTPATIENT (OUTPATIENT)
Dept: OUTPATIENT SERVICES | Facility: HOSPITAL | Age: 44
LOS: 1 days | End: 2024-02-23
Payer: COMMERCIAL

## 2024-02-23 ENCOUNTER — APPOINTMENT (OUTPATIENT)
Dept: CT IMAGING | Facility: CLINIC | Age: 44
End: 2024-02-23
Payer: COMMERCIAL

## 2024-02-23 DIAGNOSIS — Z86.16 PERSONAL HISTORY OF COVID-19: ICD-10-CM

## 2024-02-23 DIAGNOSIS — J45.909 UNSPECIFIED ASTHMA, UNCOMPLICATED: ICD-10-CM

## 2024-02-23 DIAGNOSIS — R94.2 ABNORMAL RESULTS OF PULMONARY FUNCTION STUDIES: ICD-10-CM

## 2024-02-23 PROCEDURE — 71250 CT THORAX DX C-: CPT

## 2024-02-23 PROCEDURE — 71250 CT THORAX DX C-: CPT | Mod: 26

## 2024-03-04 DIAGNOSIS — R91.1 SOLITARY PULMONARY NODULE: ICD-10-CM

## 2024-03-08 ENCOUNTER — APPOINTMENT (OUTPATIENT)
Dept: PULMONOLOGY | Facility: CLINIC | Age: 44
End: 2024-03-08
Payer: COMMERCIAL

## 2024-03-08 PROCEDURE — 94010 BREATHING CAPACITY TEST: CPT

## 2024-03-08 PROCEDURE — 94729 DIFFUSING CAPACITY: CPT

## 2024-03-08 PROCEDURE — 95012 NITRIC OXIDE EXP GAS DETER: CPT

## 2024-03-08 PROCEDURE — 94726 PLETHYSMOGRAPHY LUNG VOLUMES: CPT

## 2024-03-20 ENCOUNTER — APPOINTMENT (OUTPATIENT)
Dept: ENDOCRINOLOGY | Facility: CLINIC | Age: 44
End: 2024-03-20
Payer: COMMERCIAL

## 2024-03-20 VITALS
RESPIRATION RATE: 17 BRPM | DIASTOLIC BLOOD PRESSURE: 68 MMHG | WEIGHT: 180 LBS | BODY MASS INDEX: 24.38 KG/M2 | HEART RATE: 48 BPM | OXYGEN SATURATION: 100 % | SYSTOLIC BLOOD PRESSURE: 120 MMHG | HEIGHT: 72 IN | TEMPERATURE: 98 F

## 2024-03-20 PROCEDURE — 99204 OFFICE O/P NEW MOD 45 MIN: CPT

## 2024-03-20 NOTE — ASSESSMENT
[FreeTextEntry1] : The patient has prediabetes and is thin so I ordered labs to determine if he has type 1 DM (presymptomatic- Stage 2).  I also ordered a thyroid US to better evaluate the patient's thyroid nodule.  Plan: 1. Thyroid US 2. Labs to be done today at [address] [place] [address]  - see below 3. Follow up 1 week after thyroid US done to review results - telehealth visit ok

## 2024-03-20 NOTE — HISTORY OF PRESENT ILLNESS
[FreeTextEntry1] : Problems: 1. Thyroid nodule 2. Prediabetes  Thyroid nodule 1. Diagnosed in Feb 2024 with thyroid nodule - found incidentally on CT 2. Radiology: 02/23/2024 - CT chest - 1.3 cm hypodense nodule within the left lobe of the thyroid gland 3. Labs: August 2023 - TSH N 4. No family history of thyroid disorders or thyroid cancer, no personal history of radiation treatment  Prediabetes 1. 03/20/2024 - Weight 180 pounds, BMI 24.41 2. Labs: August 2023 - HbA1c 5.7%, Hb N

## 2024-03-20 NOTE — PHYSICAL EXAM
[de-identified] : General: No distress, well nourished Eyes: Normal Sclera, EOMI, PERRL ENT: Normal appearance of the nose, normal oropharynx Neck/Thyroid: No cervical lymphadenopathy, thyroid gland 20 g in size, no thyroid nodules, non-tender Respiratory: No use of accessory muscles of respiration, vesicular breath sounds heard bilaterally, no crepitations or ronchi Cardiovascular: S1 and S2 heard and normal, no S3 or S4, no murmurs, radial pulse normal bilaterally Abdomen: soft, non-tender, no masses, normal bowel sounds Musculoskeletal: No swelling or deformities of joints of hands, no pedal edema Neurological: Normal range of motion in the hands, Normal brachioradialis reflexes bilaterally Psychiatry: Patient converses normally, good judgement and insight Skin: No rashes in hands, no nodules palpated in hands

## 2024-03-21 ENCOUNTER — NON-APPOINTMENT (OUTPATIENT)
Age: 44
End: 2024-03-21

## 2024-03-21 LAB
ALBUMIN SERPL ELPH-MCNC: 4.9 G/DL
ALP BLD-CCNC: 68 U/L
ALT SERPL-CCNC: 7 U/L
ANION GAP SERPL CALC-SCNC: 12 MMOL/L
AST SERPL-CCNC: 18 U/L
BASOPHILS # BLD AUTO: 0.03 K/UL
BASOPHILS NFR BLD AUTO: 0.4 %
BILIRUB SERPL-MCNC: 0.5 MG/DL
BUN SERPL-MCNC: 12 MG/DL
C PEPTIDE SERPL-MCNC: 0.9 NG/ML
CALCIUM SERPL-MCNC: 9.8 MG/DL
CHLORIDE SERPL-SCNC: 104 MMOL/L
CHOLEST SERPL-MCNC: 184 MG/DL
CO2 SERPL-SCNC: 26 MMOL/L
CREAT SERPL-MCNC: 0.95 MG/DL
EGFR: 101 ML/MIN/1.73M2
EOSINOPHIL # BLD AUTO: 0.03 K/UL
EOSINOPHIL NFR BLD AUTO: 0.4 %
ESTIMATED AVERAGE GLUCOSE: 114 MG/DL
GLUCOSE SERPL-MCNC: 102 MG/DL
HBA1C MFR BLD HPLC: 5.6 %
HCT VFR BLD CALC: 40.6 %
HDLC SERPL-MCNC: 72 MG/DL
HGB BLD-MCNC: 13.6 G/DL
IMM GRANULOCYTES NFR BLD AUTO: 0.3 %
LDLC SERPL CALC-MCNC: 103 MG/DL
LYMPHOCYTES # BLD AUTO: 1.79 K/UL
LYMPHOCYTES NFR BLD AUTO: 25.6 %
MAN DIFF?: NORMAL
MCHC RBC-ENTMCNC: 30 PG
MCHC RBC-ENTMCNC: 33.5 GM/DL
MCV RBC AUTO: 89.6 FL
MONOCYTES # BLD AUTO: 0.4 K/UL
MONOCYTES NFR BLD AUTO: 5.7 %
NEUTROPHILS # BLD AUTO: 4.71 K/UL
NEUTROPHILS NFR BLD AUTO: 67.6 %
NONHDLC SERPL-MCNC: 112 MG/DL
PLATELET # BLD AUTO: 213 K/UL
POTASSIUM SERPL-SCNC: 4.3 MMOL/L
PROT SERPL-MCNC: 7 G/DL
RBC # BLD: 4.53 M/UL
RBC # FLD: 12.8 %
SODIUM SERPL-SCNC: 142 MMOL/L
TRIGL SERPL-MCNC: 45 MG/DL
TSH SERPL-ACNC: 0.75 UIU/ML
WBC # FLD AUTO: 6.98 K/UL

## 2024-03-22 ENCOUNTER — APPOINTMENT (OUTPATIENT)
Dept: OTOLARYNGOLOGY | Facility: CLINIC | Age: 44
End: 2024-03-22
Payer: COMMERCIAL

## 2024-03-22 ENCOUNTER — APPOINTMENT (OUTPATIENT)
Dept: PULMONOLOGY | Facility: CLINIC | Age: 44
End: 2024-03-22
Payer: COMMERCIAL

## 2024-03-22 VITALS
DIASTOLIC BLOOD PRESSURE: 70 MMHG | SYSTOLIC BLOOD PRESSURE: 104 MMHG | WEIGHT: 180 LBS | HEART RATE: 78 BPM | HEIGHT: 72 IN | OXYGEN SATURATION: 98 % | RESPIRATION RATE: 16 BRPM | BODY MASS INDEX: 24.38 KG/M2

## 2024-03-22 VITALS
HEART RATE: 61 BPM | BODY MASS INDEX: 24.38 KG/M2 | HEIGHT: 72 IN | SYSTOLIC BLOOD PRESSURE: 108 MMHG | WEIGHT: 180 LBS | DIASTOLIC BLOOD PRESSURE: 73 MMHG

## 2024-03-22 DIAGNOSIS — R91.8 OTHER NONSPECIFIC ABNORMAL FINDING OF LUNG FIELD: ICD-10-CM

## 2024-03-22 DIAGNOSIS — Z78.9 OTHER SPECIFIED HEALTH STATUS: ICD-10-CM

## 2024-03-22 DIAGNOSIS — J45.909 UNSPECIFIED ASTHMA, UNCOMPLICATED: ICD-10-CM

## 2024-03-22 DIAGNOSIS — R06.89 OTHER ABNORMALITIES OF BREATHING: ICD-10-CM

## 2024-03-22 DIAGNOSIS — Z86.16 PERSONAL HISTORY OF COVID-19: ICD-10-CM

## 2024-03-22 DIAGNOSIS — J34.2 DEVIATED NASAL SEPTUM: ICD-10-CM

## 2024-03-22 DIAGNOSIS — R94.2 ABNORMAL RESULTS OF PULMONARY FUNCTION STUDIES: ICD-10-CM

## 2024-03-22 DIAGNOSIS — Z87.09 PERSONAL HISTORY OF OTHER DISEASES OF THE RESPIRATORY SYSTEM: ICD-10-CM

## 2024-03-22 PROCEDURE — 99204 OFFICE O/P NEW MOD 45 MIN: CPT | Mod: 25,57

## 2024-03-22 PROCEDURE — 99213 OFFICE O/P EST LOW 20 MIN: CPT

## 2024-03-22 PROCEDURE — 31231 NASAL ENDOSCOPY DX: CPT

## 2024-03-22 PROCEDURE — G2211 COMPLEX E/M VISIT ADD ON: CPT

## 2024-03-22 RX ORDER — MINOXIDIL 2.5 MG/1
2.5 TABLET ORAL
Qty: 90 | Refills: 0 | Status: ACTIVE | COMMUNITY
Start: 2024-02-09

## 2024-03-22 RX ORDER — FLUTICASONE PROPIONATE 50 UG/1
50 SPRAY, METERED NASAL DAILY
Qty: 1 | Refills: 5 | Status: ACTIVE | COMMUNITY
Start: 2024-03-22 | End: 1900-01-01

## 2024-03-22 NOTE — PHYSICAL EXAM
[FreeTextEntry1] : breathing difficulty through nose [Nasal Endoscopy Performed] : nasal endoscopy was performed, see procedure section for findings [] : septum deviated bilaterally [de-identified] : hypertrophy [Midline] : trachea located in midline position [Normal] : no rashes

## 2024-03-22 NOTE — ADDENDUM
[FreeTextEntry1] : Scribe Attestation: I, Axel Woodward, am scribing for and in the presence of Dr. Bryan Simpson in the following sections HISTORY OF PRESENT ILLNESS, PAST MEDICAL/FAMILY/SOCIAL HISTORY; REVIEW OF SYSTEMS; VITAL SIGNS; PHYSICAL EXAM; PROCEDURES; DISCUSSION.   I, Dr. Bryan Simpson, personally performed the services described in the documentation, reviewed the documentation recorded by the scribe in my presence, and it accurately and completely records my words and actions.

## 2024-03-22 NOTE — CONSULT LETTER
[Dear  ___] : Dear  [unfilled], [Consult Letter:] : I had the pleasure of evaluating your patient, [unfilled]. [Please see my note below.] : Please see my note below. [Consult Closing:] : Thank you very much for allowing me to participate in the care of this patient.  If you have any questions, please do not hesitate to contact me. [Sincerely,] : Sincerely, [FreeTextEntry2] : Carmen Rey [FreeTextEntry3] : Bryan Simpson MD, TJ, FACS  Department Otolaryngology Director of [facility] Professor of Otolaryngology,  [facility]ryn [facility] [employer] [facility]

## 2024-03-22 NOTE — PHYSICAL EXAM
[IV] : Mallampati Class: IV [Supple] : supple [Normal Appearance] : normal appearance [No JVD] : no jvd [Normal Rate/Rhythm] : normal rate/rhythm [Normal S1, S2] : normal s1, s2 [No Murmurs] : no murmurs [No Resp Distress] : no resp distress [No Acc Muscle Use] : no acc muscle use [Benign] : benign [Clear to Auscultation Bilaterally] : clear to auscultation bilaterally [Not Tender] : not tender [No Masses] : no masses [Soft] : soft [Normal Gait] : normal gait [No Edema] : no edema [No Clubbing] : no clubbing [No Rash] : no rash [No Motor Deficits] : no motor deficits [Normal Affect] : normal affect [TextBox_2] : pleasant male no distress speaking full sentences no cough  [TextBox_125] : face  eczema  lesions  [TextBox_11] : no lesion moist    crowded airway

## 2024-03-22 NOTE — PROCEDURE
[Image(s) Captured] : image(s) captured and filed [Video Captured] : video captured and filed [Topical Lidocaine] : topical lidocaine [Oxymetazoline HCl] : oxymetazoline HCl [Flexible Endoscope] : examined with the flexible endoscope [Serial Number: ___] : Serial Number: [unfilled] [Anterior rhinoscopy insufficient to account for symptoms] : anterior rhinoscopy insufficient to account for symptoms [Recalcitrant Symptoms] : recalcitrant symptoms  [Congested] : congested [S-Shaped Deviated] : S-shape deviation [___ % Obstructed] : [unfilled]U% obstructed [Normal] : the middle meatus had no abnormalities [FreeTextEntry2] : R caudal deflection, L deviated posteriorly [FreeTextEntry6] : Pre-op indication(s): difficulty breathing Post-op indication(s): deviated septum bilaterally R worse than L, turbinate hypertrophy Verbal consent obtained from patient. Anterior rhinoscopy insufficient to account for symptoms  Details for procedure:  Scope #: 211 Type of scope:    flexible fiber optic telescope      Anesthesia and/or vasoconstriction was achieved topically by usin% Lidocaine spray   0.05% Oxymetazoline     Other ______  The following anatomic sites were directly examined in a sequential fashion:  The scope was introduced in the nasal passage between the middle and inferior turbinates to exam the inferior portion of the middle meatus and the fontanelle, as well as the maxillary ostia. Next, the scope was passed medially and posteriorly to the middle turbinates to examine the sphenoethmoid recess and the superior turbinate region.  Upon visualization the finders are as follows:  Nasal Septum:   Deviated to   left+right Bleeding site cauterized:    Anterior   left   right   Posterior   left   right  Method:   Silver Nitrate   YAG Laser    Electrocautery ______  Right Side:  * Mucosa: Normal * Mucous: Normal * Polyp: Normal * Inferior Turbinate: hypertrophy * Middle Turbinate: Normal * Superior Turbinate: Normal * Inferior Meatus: Normal * Middle Meatus: Normal * Super Meatus: Normal * Sphenoethmoidal Recess: Normal Left Side:  * Mucosa: Normal * Mucous: Normal * Polyp: Normal * Inferior Turbinate: hypertrophy * Middle Turbinate: Normal * Superior Turbinate: Normal * Inferior Meatus: Normal * Middle Meatus: Normal * Super Meatus: Normal * Sphenoethmoidal Recess: Normal The patient tolerated the procedure well without any complications.    [FreeTextEntry5] : not visualized secondary to deviation

## 2024-03-22 NOTE — HISTORY OF PRESENT ILLNESS
[Never] : never [TextBox_4] : 2/9/2024 44y/o male  born in [place]   -  never smoker  work    investment  banker  -   no fumes exposure  -   first  visit for abnormal PFT / wheezing - had   preventative   health  spirometry    in chart   with mild restriction  no full  lung volumes performed - h/o eczema - has noted wheezing when  exercising    and  playing hockey at times - no chest pain  no    sob -doing well otherwise -was told  has a deviated septum  -no  nasal issues  currently   3/22/2024 44y/o male  never smoker     here for reactive airways  and PFT restrictive vent defect - ct chest no  lung abn  thyroid  nodule - currently feeling good no sob  -no chest pain  -reviewed   ct and PFT  - mild restriction     however if corrected for   long legs /short torso    corrects -doing well albuterol only prn  - -

## 2024-03-22 NOTE — ASSESSMENT
[FreeTextEntry1] : 44y/o  male never smoker  1-  abnormal PFT with  mild restrictive ventilatory ? correction for habitus-  short torso long legs? + reactive airways 2- CT 2/24   with  2-3mm  nodules  thyroid nodule   + second hand smoke exposure  3-  eczema hx  /deviated septum  4- vaccination per primary   recommendations 1- ENT referral  for septum / inspection  2- endocrine  follo wup  3- albuterol MDI  prior to exercise   2 inh  q  6hour prn  4- f/u ct   2/25 5- sniff test   return in one year    agreement on plan  and  discussion today

## 2024-03-22 NOTE — REASON FOR VISIT
[Initial Consultation] : an initial consultation for [FreeTextEntry2] : referred by Dr. Carmen Rey, pulmonologist, for difficulty breathing through the nose

## 2024-03-22 NOTE — HISTORY OF PRESENT ILLNESS
[de-identified] : 43 year old male referred by Dr. Carmen Rey, pulmonologist, for difficulty breathing through the nose.  States symptoms for years.  Denies nasal congestion, sinus pain, sinus pressure, post nasal drip or nasal discharge, no sinus infections in the past year. Used Flonase w/o relief.

## 2024-03-22 NOTE — REVIEW OF SYSTEMS
[Fever] : no fever [Fatigue] : no fatigue [Recent Wt Gain (___ Lbs)] : ~T no recent weight gain [Chills] : no chills [Poor Appetite] : no poor appetite [Recent Wt Loss (___ Lbs)] : ~T no recent weight loss [Dry Eyes] : no dry eyes [Epistaxis] : no epistaxis [Sore Throat] : no sore throat [Postnasal Drip] : no postnasal drip [Dry Mouth] : no dry mouth [Sinus Problems] : no sinus problems [Cough] : no cough [Hemoptysis] : no hemoptysis [Sputum] : no sputum [Dyspnea] : no dyspnea [SOB on Exertion] : no sob on exertion [Wheezing] : no wheezing [Chest Discomfort] : no chest discomfort [Seasonal Allergies] : no seasonal allergies [Palpitations] : no palpitations [Nasal Discharge] : no nasal discharge [Immunocompromised] : not immunocompromised [GERD] : no gerd [Nausea] : no nausea [Abdominal Pain] : no abdominal pain [Vomiting] : no vomiting [Dysphagia] : no dysphagia [Chronic Pain] : no chronic pain [Bleeding] : no bleeding [Rash] : no rash [Blood Transfusion] : no blood transfusion [Clotting Disorder/ Frequent bleeding] : no clotting disorder/ frequent bleeding [Diabetes] : no diabetes [Thyroid Problem] : no thyroid problem [Obesity] : no obesity [TextBox_101] : eczema

## 2024-03-25 ENCOUNTER — OUTPATIENT (OUTPATIENT)
Dept: OUTPATIENT SERVICES | Facility: HOSPITAL | Age: 44
LOS: 1 days | End: 2024-03-25
Payer: COMMERCIAL

## 2024-03-25 ENCOUNTER — APPOINTMENT (OUTPATIENT)
Dept: ULTRASOUND IMAGING | Facility: CLINIC | Age: 44
End: 2024-03-25
Payer: COMMERCIAL

## 2024-03-25 DIAGNOSIS — E04.1 NONTOXIC SINGLE THYROID NODULE: ICD-10-CM

## 2024-03-25 LAB — PANC ISLET CELL AB SER QL: NORMAL

## 2024-03-25 PROCEDURE — 76536 US EXAM OF HEAD AND NECK: CPT | Mod: 26

## 2024-03-25 PROCEDURE — 76536 US EXAM OF HEAD AND NECK: CPT

## 2024-03-28 LAB
GAD65 AB SER-MCNC: 0.04 NMOL/L
ISLET CELL512 AB SER-SCNC: 0 NMOL/L

## 2024-03-30 LAB — INSULIN ANTIBODIES-ESOTERIX: <5 UU/ML

## 2024-04-05 ENCOUNTER — APPOINTMENT (OUTPATIENT)
Dept: ENDOCRINOLOGY | Facility: CLINIC | Age: 44
End: 2024-04-05
Payer: COMMERCIAL

## 2024-04-05 ENCOUNTER — RESULT REVIEW (OUTPATIENT)
Age: 44
End: 2024-04-05

## 2024-04-05 PROCEDURE — 99214 OFFICE O/P EST MOD 30 MIN: CPT

## 2024-04-05 NOTE — PHYSICAL EXAM
[de-identified] : General: No distress, well nourished Eyes: Normal external appearance ENT: Normal appearance of the nose Neck/Thyroid: No visible neck swelling Respiratory: No use of accessory muscles of respiration Psychiatry: Patient converses normally, good judgement and insight Skin: No rashes seen on face

## 2024-04-05 NOTE — HISTORY OF PRESENT ILLNESS
[FreeTextEntry1] : Problems: 1. Thyroid nodule 2. Prediabetes/Impaired fasting glucose  Thyroid nodule 1. Diagnosed in Feb 2024 with thyroid nodule - found incidentally on CT 2. Radiology: 02/23/2024 - CT chest - 1.3 cm hypodense nodule within the left lobe of the thyroid gland 03/25/2024 - US thyroid - Right lobe measures 4.4 cm and contains a mid pole 3.1 x 1.4 x 1.7 cm nodule, left lobe measures 4.6 cm - no abnormal cervical lymph nodes seen.  3. Labs: August 2023 - TSH N March 2024 - TSH N 4. No family history of thyroid disorders or thyroid cancer, no personal history of radiation treatment   Prediabetes/Impaired fasting glucose 1. 03/20/2024 - Weight 180 pounds, BMI 24.41 2. Labs: August 2023 - HbA1c 5.7%, Hb N March 2024 - CHARO 0.04 (</=0.02), Islet Antigen 2 Ab - neg, Islet cell antibody neg, insulin antibodies neg, c-peptide 0.9 (1.1 to 4.4), HbA1c 5.6%, Glucose 102, Cr N, AST N, ALT 7 (10 to 45), TSH N

## 2024-04-05 NOTE — REASON FOR VISIT
[Medical Office: ([facility])___] : at the medical office located in  [Patient] : the patient [Follow - Up] : a follow-up visit [Thyroid nodule/ MNG] : thyroid nodule/ MNG [Other Location: e.g. School (Enter Location, City,State)___] : at [unfilled], at the time of the visit.

## 2024-04-05 NOTE — ASSESSMENT
[FreeTextEntry1] : The patient has prediabetes/impaired fasting glucose - labs from March 2024 revealed a low c- peptide with normal Cr, glucose of 102, mildly positive CHARO - thus the patient may have type 1 DM (presymptomatic- Stage 2) - however this is not definitely confirmed. I will continue to monitor his HbA1c and glucose levels and if he develops DM, then I will repeat the c-peptide level to determine if he has DM type 1. In the meantime, the patient is to follow a diet and exercise plan to prevent the progression to DM.   The patient is euthyroid - I ordered an FNA of the right midpole 3.1 cm thyroid nodule.   Last HbA1c - 03/20/2024 - 5.6% Last TSH - March 2024 - N  Plan: 1. US guided FNA of the right midpole 3.1 cm nodule 2. No labs ordered 3. Follow up 2 weeks after thyroid FNA done to review results - telehealth visit ok.

## 2024-05-03 ENCOUNTER — APPOINTMENT (OUTPATIENT)
Dept: ULTRASOUND IMAGING | Facility: IMAGING CENTER | Age: 44
End: 2024-05-03
Payer: COMMERCIAL

## 2024-05-03 ENCOUNTER — OUTPATIENT (OUTPATIENT)
Dept: OUTPATIENT SERVICES | Facility: HOSPITAL | Age: 44
LOS: 1 days | End: 2024-05-03
Payer: COMMERCIAL

## 2024-05-03 ENCOUNTER — RESULT REVIEW (OUTPATIENT)
Age: 44
End: 2024-05-03

## 2024-05-03 DIAGNOSIS — Z00.8 ENCOUNTER FOR OTHER GENERAL EXAMINATION: ICD-10-CM

## 2024-05-03 DIAGNOSIS — E04.1 NONTOXIC SINGLE THYROID NODULE: ICD-10-CM

## 2024-05-03 PROCEDURE — 10005 FNA BX W/US GDN 1ST LES: CPT

## 2024-05-03 PROCEDURE — 88173 CYTOPATH EVAL FNA REPORT: CPT

## 2024-05-03 PROCEDURE — 88172 CYTP DX EVAL FNA 1ST EA SITE: CPT

## 2024-05-03 PROCEDURE — 88173 CYTOPATH EVAL FNA REPORT: CPT | Mod: 26

## 2024-05-13 ENCOUNTER — APPOINTMENT (OUTPATIENT)
Dept: ENDOCRINOLOGY | Facility: CLINIC | Age: 44
End: 2024-05-13
Payer: COMMERCIAL

## 2024-05-13 DIAGNOSIS — R73.01 IMPAIRED FASTING GLUCOSE: ICD-10-CM

## 2024-05-13 DIAGNOSIS — R73.03 PREDIABETES.: ICD-10-CM

## 2024-05-13 DIAGNOSIS — E04.1 NONTOXIC SINGLE THYROID NODULE: ICD-10-CM

## 2024-05-13 PROCEDURE — 99214 OFFICE O/P EST MOD 30 MIN: CPT

## 2024-05-13 NOTE — HISTORY OF PRESENT ILLNESS
[FreeTextEntry1] : Problems: 1. Thyroid nodule 2. Prediabetes/Impaired fasting glucose  Thyroid nodule 1. Diagnosed in Feb 2024 with thyroid nodule - found incidentally on CT 2. Radiology: 02/23/2024 - CT chest - 1.3 cm hypodense nodule within the left lobe of the thyroid gland 03/25/2024 - US thyroid - Right lobe measures 4.4 cm, left lobe measures 4.6 cm - no abnormal cervical lymph nodes seen. The report also states that there is a right mid pole 3.1 x 1.4 x 1.7 cm nodule BUT THIS IS AN ERROR - I SAW THE IMAGES MYSELF AND THE PATIENT HAS A LEFT mid pole 3.1 x 1.4 x 1.7 cm nodule 05/03/2024 - US guided FNA of the left midpole nodule - benign - Category II 3. Labs: August 2023 - TSH N March 2024 - TSH N 4. No family history of thyroid disorders or thyroid cancer, no personal history of radiation treatment   Prediabetes/Impaired fasting glucose 1. 03/20/2024 - Weight 180 pounds, BMI 24.41 2. Labs: August 2023 - HbA1c 5.7%, Hb N March 2024 - CHARO 0.04 (</=0.02), Islet Antigen 2 Ab - neg, Islet cell antibody neg, insulin antibodies neg, c-peptide 0.9 (1.1 to 4.4), HbA1c 5.6%, Glucose 102, Cr N, AST N, ALT 7 (10 to 45), TSH N

## 2024-05-13 NOTE — PHYSICAL EXAM
[de-identified] : General: No distress, well nourished Eyes: Normal external appearance ENT: Normal appearance of the nose Neck/Thyroid: No visible neck swelling Respiratory: No use of accessory muscles of respiration Psychiatry: Patient converses normally, good judgement and insight Skin: No rashes seen on face

## 2024-05-13 NOTE — ASSESSMENT
[FreeTextEntry1] : The patient has prediabetes/impaired fasting glucose - labs from March 2024 revealed a low c- peptide with normal Cr, glucose of 102, mildly positive CHARO - thus the patient may have type 1 DM (presymptomatic- Stage 2) - however this is not definitely confirmed. I will continue to monitor his HbA1c and glucose levels and if he develops DM, then I will repeat the c-peptide level to determine if he has DM type 1. In the meantime, the patient is to follow a diet and exercise plan to prevent the progression to DM.  The patient is euthyroid - FNA of the left midpole 3.1 cm thyroid nodule done in May 2024 was benign - next thyroid US due in April 2025.   The patient wishes to be evaluated for hypogonadism so I ordered labs for this.  Last HbA1c - 03/20/2024 - 5.6% Last TSH - March 2024 - N  Plan: 1. Labs to be done this week: Day 1 - 8 am to 10 am - these are labs ordered for 05/14/2024 Day 2 - 8 am to 10 am - these are labs ordered for 05/15/2024 2. Follow up 2 weeks after labs done to review results - telehealth visit ok.

## 2024-05-13 NOTE — REASON FOR VISIT
[Medical Office: ([facility])___] : at the medical office located in  [Patient] : the patient [Follow - Up] : a follow-up visit [Other Location: e.g. School (Enter Location, City,State)___] : at [unfilled], at the time of the visit.

## 2024-05-24 ENCOUNTER — OUTPATIENT (OUTPATIENT)
Dept: OUTPATIENT SERVICES | Facility: HOSPITAL | Age: 44
LOS: 1 days | End: 2024-05-24

## 2024-05-24 VITALS
TEMPERATURE: 98 F | HEIGHT: 72 IN | DIASTOLIC BLOOD PRESSURE: 78 MMHG | HEART RATE: 60 BPM | SYSTOLIC BLOOD PRESSURE: 112 MMHG | OXYGEN SATURATION: 98 % | WEIGHT: 177.91 LBS | RESPIRATION RATE: 16 BRPM

## 2024-05-24 DIAGNOSIS — J34.2 DEVIATED NASAL SEPTUM: ICD-10-CM

## 2024-05-24 DIAGNOSIS — Z87.81 PERSONAL HISTORY OF (HEALED) TRAUMATIC FRACTURE: Chronic | ICD-10-CM

## 2024-05-24 RX ORDER — MINOXIDIL 10 MG
2.5 TABLET ORAL
Refills: 0 | DISCHARGE

## 2024-05-24 NOTE — H&P PST ADULT - PROBLEM SELECTOR PLAN 1
Scheduled for septoplasty turbinectomy  Written & verbal preop instructions, gi prophylaxis.  Pt verbalized good understanding.

## 2024-05-24 NOTE — H&P PST ADULT - BMI (KG/M2)
Dupixent Pregnancy And Lactation Text: This medication likely crosses the placenta but the risk for the fetus is uncertain. This medication is excreted in breast milk. 24.1

## 2024-05-24 NOTE — H&P PST ADULT - HISTORY OF PRESENT ILLNESS
42y/o male presents for preop eval for scheduled septoplasty turbinectomy.  Pt with c/o on chronic nasal obstruction.  Dx with deviated nasal septum & hypertrophy of nasal turbinates.

## 2024-05-24 NOTE — H&P PST ADULT - HEIGHT IN CM
Continue to MONITOR CLOSELY to determine the need for TREATMENT and INCREASE/DECREASE in length of time till next follow up visit. 182.88

## 2024-05-24 NOTE — H&P PST ADULT - ANESTHESIA, PREVIOUS REACTION, PROFILE
#Abdominal aortic aneurysm measuring up to 3.3 cm with eccentric   plaque.  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   - case D/W Dr. Scott; no acute vascular intervention warranted at this time   - F/U as outpt with Dr. Scott   - Recommend outpatient EGD   Mallampati III, denies loose teeth/none

## 2024-05-24 NOTE — H&P PST ADULT - ATTENDING PHYSICIAN (PRINT):______________________________ DATE:_______ TIME:_______
Patient Name: Khoa Arnold  : 1956    MRN: [id number]                              Today's Date: 2019       Admit Date: 2019    Visit Dx:     ICD-10-CM ICD-9-CM   1. Coronary artery disease involving native coronary artery of native heart with other form of angina pectoris (CMS/[facility]) I25.118 414.01     413.9   2. Unstable angina (CMS/[facility]) I20.0 411.1     Patient Active Problem List   Diagnosis   • Unstable angina (CMS/[facility])   • Coronary artery disease   • Type 2 diabetes mellitus with circulatory disorder, without long-term current use of insulin (CMS/[facility])   • Benign essential HTN     Past Medical History:   Diagnosis Date   • Asthma    • Coronary artery disease    • Diabetes mellitus (CMS/[facility])    • Hyperlipidemia    • Hypertension    • Stroke (CMS/[facility])     , no residual   • Type 2 diabetes mellitus with circulatory disorder, without long-term current use of insulin (CMS/[facility]) 2019     Past Surgical History:   Procedure Laterality Date   • CARDIAC CATHETERIZATION      3 stents    • CARDIAC CATHETERIZATION N/A 2019    Procedure: Left Heart Cath;  Surgeon: Jonathan Pineda MD;  Location:  [facility] CATH INVASIVE LOCATION;  Service: Cardiovascular   • CHOLECYSTECTOMY     • COLONOSCOPY     • CORONARY ANGIOPLASTY WITH STENT PLACEMENT      x 3, last one    • CORONARY ARTERY BYPASS GRAFT WITH AORTIC VALVE REPAIR/REPLACEMENT N/A 9/10/2019    Procedure: CARLYN PER ANESTHESIA, MEDIAN STERNOTOMY, CORONARY ARTERY BYPASS GRAFT X 4, UTILIZING THE LEFT INTERNAL MAMMARY ARTERY, AND EVH OF THE RIGHT GREATER SAPHENOUS VEIN,   AORTIC VALVE REPLACEMENT;  Surgeon: Denys Goldsmith MD;  Location: [facility] OR;  Service: Cardiothoracic   • UMBILICAL HERNIA REPAIR       General Information     Row Name 19 1142          PT Evaluation Time/Intention    Document Type  therapy note (daily note)  -MARCO     Mode of Treatment  physical therapy  -MARCO     Row Name 19 1142          General Information     Patient Profile Reviewed?  yes  -MARCO     Prior Level of Function  independent:  -MARCO     Existing Precautions/Restrictions  cardiac;oxygen therapy device and L/min;sternal  -MARCO     Row Name 09/13/19 1142          Cognitive Assessment/Intervention- PT/OT    Orientation Status (Cognition)  oriented x 4  -MARCO     Row Name 09/13/19 1142          Safety Issues, Functional Mobility    Safety Issues Affecting Function (Mobility)  safety precaution awareness;safety precautions follow-through/compliance;awareness of need for assistance;insight into deficits/self awareness  -MARCO     Impairments Affecting Function (Mobility)  balance;endurance/activity tolerance;strength;shortness of breath  -MARCO       User Key  (r) = Recorded By, (t) = Taken By, (c) = Cosigned By    Initials Name Provider Type    MARCO Babita Castillo, PT Physical Therapist        Mobility     Row Name 09/13/19 1142          Bed Mobility Assessment/Treatment    Bed Mobility Assessment/Treatment  rolling left;rolling right;scooting/bridging;sit-supine  -MARCO     Rolling Left Harris (Bed Mobility)  minimum assist (75% patient effort)  -MARCO     Rolling Right Harris (Bed Mobility)  minimum assist (75% patient effort)  -MARCO     Scooting/Bridging Harris (Bed Mobility)  contact guard  -MARCO     Sit-Supine Harris (Bed Mobility)  minimum assist (75% patient effort)  -MARCO     Assistive Device (Bed Mobility)  draw sheet;head of bed elevated  -     Row Name 09/13/19 1142          Transfer Assessment/Treatment    Comment (Transfers)  cues for sternal precautions, patient needs assist to gain his balance in standing tends to fall backward  -MARCO     Row Name 09/13/19 1142          Bed-Chair Transfer    Bed-Chair Harris (Transfers)  minimum assist (75% patient effort)  -MARCO     Assistive Device (Bed-Chair Transfers)  walker, front-wheeled  -MARCO     Row Name 09/13/19 1142          Sit-Stand Transfer    Sit-Stand Harris (Transfers)  minimum assist (75%  patient effort)  -MARCO     Assistive Device (Sit-Stand Transfers)  walker, front-wheeled  -MARCO     Row Name 09/13/19 1142          Gait/Stairs Assessment/Training    Gait/Stairs Assessment/Training  gait/ambulation independence  -MARCO     [place] Level (Gait)  minimum assist (75% patient effort);2 person assist  -MARCO     Assistive Device (Gait)  walker, front-wheeled  -MARCO     Distance in Feet (Gait)  140 ft  -MARCO     Pattern (Gait)  step-to  -MARCO     Deviations/Abnormal Patterns (Gait)  base of support, wide;stride length decreased  -MARCO     Bilateral Gait Deviations  forward flexed posture  -MARCO     Comment (Gait/Stairs)  patient tends to get the walker too far ahead of himself therefore he has several LOB with ambulation especially making turns  -MARCO       User Key  (r) = Recorded By, (t) = Taken By, (c) = Cosigned By    Initials Name Provider Type    MARCO Babita Castillo, PT Physical Therapist        Obj/Interventions     Row Name 09/13/19 1145          Therapeutic Exercise    Lower Extremity Range of Motion (Therapeutic Exercise)  hip flexion/extension, bilateral;knee flexion/extension, bilateral;ankle dorsiflexion/plantar flexion, bilateral  -MARCO     Exercise Type (Therapeutic Exercise)  AROM (active range of motion)  -MARCO     Position (Therapeutic Exercise)  seated  -MARCO     Sets/Reps (Therapeutic Exercise)  1/10  -MARCO     Expected Outcome (Therapeutic Exercise)  facilitate normal movement patterns;improve functional stability;improve functional tolerance, self-care activity  -MARCO     Row Name 09/13/19 1145          Static Sitting Balance    Level of [place] (Unsupported Sitting, Static Balance)  independent  -MARCO     Sitting Position (Unsupported Sitting, Static Balance)  sitting on edge of bed  -MARCO     Time Able to Maintain Position (Unsupported Sitting, Static Balance)  more than 5 minutes  -MARCO     Row Name 09/13/19 1145          Dynamic Sitting Balance    Level of [place], Reaches Outside Midline (Sitting,  Dynamic Balance)  contact guard assist  -MARCO     Sitting Position, Reaches Outside Midline (Sitting, Dynamic Balance)  sitting on edge of bed  -MARCO     Row Name 09/13/19 1145          Static Standing Balance    Level of [place] (Supported Standing, Static Balance)  minimal assist, 75% patient effort;2 person assist  -MARCO     Time Able to Maintain Position (Supported Standing, Static Balance)  1 to 2 minutes  -MARCO     Assistive Device Utilized (Supported Standing, Static Balance)  walker, rolling  -MARCO     Row Name 09/13/19 1145          Dynamic Standing Balance    Level of [place], Reaches Outside Midline (Standing, Dynamic Balance)  minimal assist, 75% patient effort;2 person assist  -MARCO     Time Able to Maintain Position, Reaches Outside Midline (Standing, Dynamic Balance)  more than 5 minutes  -MARCO     Assistive Device Utilized (Supported Standing, Dynamic Balance)  walker, rolling  -MARCO       User Key  (r) = Recorded By, (t) = Taken By, (c) = Cosigned By    Initials Name Provider Type    Babita Ybarra, PT Physical Therapist        Goals/Plan    No documentation.       Clinical Impression     Row Name 09/13/19 0923          Pain Scale: Numbers Pre/Post-Treatment    Pain Scale: Numbers, Pretreatment  8/10  -MARCO     Pain Scale: Numbers, Post-Treatment  8/10  -MARCO     Pain Location - Side  Right  -MARCO     Pain Location - Orientation  lower  -MARCO     Pain Location  extremity  -MARCO     Pre/Post Treatment Pain Comment  pain due to swelling ans blisters on right LE  -MARCO     Pain Intervention(s)  Ambulation/increased activity;Repositioned;Splinting  -MARCO     Row Name 09/13/19 0923          Physical Therapy Clinical Impression    Patient/Family Goals Statement (PT Clinical Impression)  patient to return home with family assist  -MARCO     Criteria for Skilled Interventions Met (PT Clinical Impression)  yes;treatment indicated  -MARCO     Rehab Potential (PT Clinical Summary)  good, to achieve stated therapy goals  -MARCO      Row Name 09/13/19 0923          Vital Signs    Pre Systolic BP Rehab  147  -MARCO     Pre Treatment Diastolic BP  71  -MARCO     Post Systolic BP Rehab  140  -MARCO     Post Treatment Diastolic BP  66  -MARCO     Pretreatment Heart Rate (beats/min)  94  -MARCO     Posttreatment Heart Rate (beats/min)  98  -MARCO     Pre SpO2 (%)  98  -MARCO     O2 Delivery Pre Treatment  nasal cannula  -MARCO     Post SpO2 (%)  96  -MARCO     O2 Delivery Post Treatment  nasal cannula  -MARCO     Pre Patient Position  Sitting  -MARCO     Intra Patient Position  Standing  -MARCO     Post Patient Position  Side Lying  -MARCO     Row Name 09/13/19 0923          Positioning and Restraints    Pre-Treatment Position  sitting in chair/recliner  -MARCO     Post Treatment Position  bed  -MARCO     In Bed  notified nsg;side lying right;fowlers;call light within reach;encouraged to call for assist;with family/caregiver  -MARCO       User Key  (r) = Recorded By, (t) = Taken By, (c) = Cosigned By    Initials Name Provider Type    Babita Ybarra PT Physical Therapist        Outcome Measures     Row Name 09/13/19 0923          How much help from another person do you currently need...    Turning from your back to your side while in flat bed without using bedrails?  3  -MARCO     Moving from lying on back to sitting on the side of a flat bed without bedrails?  3  -MARCO     Moving to and from a bed to a chair (including a wheelchair)?  3  -MARCO     Standing up from a chair using your arms (e.g., wheelchair, bedside chair)?  3  -MARCO     Climbing 3-5 steps with a railing?  1  -MARCO     To walk in hospital room?  3  -MARCO     AM-PAC 6 Clicks Score (PT)  16  -MARCO       User Key  (r) = Recorded By, (t) = Taken By, (c) = Cosigned By    Initials Name Provider Type    Babita Ybarra PT Physical Therapist        Physical Therapy Education     Title: PT OT SLP Therapies (In Progress)     Topic: Physical Therapy (In Progress)     Point: Mobility training (In Progress)     Learning Progress Summary            Patient Acceptance, E, NR by MARCO at 9/13/2019  9:23 AM    Acceptance, E, NR by KR at 9/12/2019  8:24 AM    Acceptance, E, VU,NR by JB1 at 9/11/2019  3:16 PM   Significant Other Acceptance, E, VU,NR by JB1 at 9/11/2019  3:16 PM                   Point: Home exercise program (In Progress)     Learning Progress Summary           Patient Acceptance, E, NR by MARCO at 9/13/2019  9:23 AM    Acceptance, E, NR by KR at 9/12/2019  8:24 AM                   Point: Body mechanics (In Progress)     Learning Progress Summary           Patient Acceptance, E, NR by MARCO at 9/13/2019  9:23 AM    Acceptance, E, NR by KR at 9/12/2019  8:24 AM    Acceptance, E, VU,NR by JB1 at 9/11/2019  3:16 PM   Significant Other Acceptance, E, VU,NR by JB1 at 9/11/2019  3:16 PM                   Point: Precautions (In Progress)     Learning Progress Summary           Patient Acceptance, E, NR by MARCO at 9/13/2019  9:23 AM    Acceptance, E, NR by KR at 9/12/2019  8:24 AM    Acceptance, E, VU,NR by JB1 at 9/11/2019  3:16 PM   Significant Other Acceptance, E, VU,NR by JB1 at 9/11/2019  3:16 PM                               User Key     Initials Effective Dates Name Provider Type Discipline    MARCO 06/19/15 -  Babita Castillo, PT Physical Therapist PT    KR 04/03/18 -  Charisma Sykes, PT Physical Therapist PT    JB1 08/30/18 -  Jackie Oscar PT Physical Therapist PT              PT Recommendation and Plan  Planned Therapy Interventions (PT Eval): balance training, bed mobility training, gait training, home exercise program, strengthening, transfer training  Outcome Summary/Treatment Plan (PT)  Anticipated Discharge Disposition (PT): home with home health  Plan of Care Reviewed With: family, spouse, patient  Outcome Summary: patient able to increase ambulation to 140 ft but he continues to be unsteady with gait with several LOB even with walker.      Time Calculation:   PT Charges     Row Name 09/13/19 0923             Time Calculation    Start Time   0923  -MARCO      PT Received On  09/13/19  -MARCO      PT Goal Re-Cert Due Date  09/21/19  -MARCO         Time Calculation- PT    Total Timed Code Minutes- PT  25 minute(s)  -MARCO         Timed Charges    69385 - PT Therapeutic Activity Minutes  25  -MARCO        User Key  (r) = Recorded By, (t) = Taken By, (c) = Cosigned By    Initials Name Provider Type    Babita Ybarra, PT Physical Therapist        Therapy Charges for Today     Code Description Service Date Service Provider Modifiers Qty    [id number]  PT THERAPEUTIC ACT EA 15 MIN 9/13/2019 Babita Castillo, PT GP 2          PT G-Codes  Outcome Measure Options: AM-PAC 6 Clicks Basic Mobility (PT)  AM-PAC 6 Clicks Score (PT): 16    Babita Castillo PT  9/13/2019        Statement Selected

## 2024-05-24 NOTE — H&P PST ADULT - NSICDXPASTMEDICALHX_GEN_ALL_CORE_FT
PAST MEDICAL HISTORY:  Deviated nasal septum     H/O thyroid nodule     Hypertrophy of nasal turbinates     Prediabetes

## 2024-05-29 ENCOUNTER — TRANSCRIPTION ENCOUNTER (OUTPATIENT)
Age: 44
End: 2024-05-29

## 2024-05-29 VITALS
TEMPERATURE: 98 F | WEIGHT: 178.57 LBS | RESPIRATION RATE: 18 BRPM | HEART RATE: 54 BPM | DIASTOLIC BLOOD PRESSURE: 68 MMHG | OXYGEN SATURATION: 99 % | SYSTOLIC BLOOD PRESSURE: 104 MMHG

## 2024-05-30 ENCOUNTER — TRANSCRIPTION ENCOUNTER (OUTPATIENT)
Age: 44
End: 2024-05-30

## 2024-05-30 ENCOUNTER — RESULT REVIEW (OUTPATIENT)
Age: 44
End: 2024-05-30

## 2024-05-30 ENCOUNTER — OUTPATIENT (OUTPATIENT)
Dept: OUTPATIENT SERVICES | Facility: HOSPITAL | Age: 44
LOS: 1 days | Discharge: HOME | End: 2024-05-30
Payer: COMMERCIAL

## 2024-05-30 ENCOUNTER — APPOINTMENT (OUTPATIENT)
Dept: OTOLARYNGOLOGY | Facility: AMBULATORY SURGERY CENTER | Age: 44
End: 2024-05-30
Payer: COMMERCIAL

## 2024-05-30 VITALS
TEMPERATURE: 99 F | RESPIRATION RATE: 16 BRPM | OXYGEN SATURATION: 99 % | HEART RATE: 68 BPM | SYSTOLIC BLOOD PRESSURE: 103 MMHG | DIASTOLIC BLOOD PRESSURE: 84 MMHG

## 2024-05-30 DIAGNOSIS — J34.2 DEVIATED NASAL SEPTUM: ICD-10-CM

## 2024-05-30 DIAGNOSIS — Z87.81 PERSONAL HISTORY OF (HEALED) TRAUMATIC FRACTURE: Chronic | ICD-10-CM

## 2024-05-30 PROCEDURE — 88311 DECALCIFY TISSUE: CPT | Mod: 26

## 2024-05-30 PROCEDURE — 30130 EXCISE INFERIOR TURBINATE: CPT | Mod: 50

## 2024-05-30 PROCEDURE — ZZZZZ: CPT

## 2024-05-30 PROCEDURE — 88304 TISSUE EXAM BY PATHOLOGIST: CPT | Mod: 26

## 2024-05-30 PROCEDURE — 30520 REPAIR OF NASAL SEPTUM: CPT

## 2024-05-30 RX ORDER — MINOXIDIL 10 MG
1 TABLET ORAL
Refills: 0 | DISCHARGE

## 2024-05-30 RX ORDER — CEPHALEXIN 500 MG
1 CAPSULE ORAL
Qty: 20 | Refills: 0
Start: 2024-05-30 | End: 2024-06-08

## 2024-05-30 RX ORDER — OXYCODONE AND ACETAMINOPHEN 5; 325 MG/1; MG/1
1 TABLET ORAL
Qty: 20 | Refills: 0
Start: 2024-05-30 | End: 2024-06-03

## 2024-05-30 RX ORDER — SODIUM CHLORIDE 9 MG/ML
1000 INJECTION, SOLUTION INTRAVENOUS
Refills: 0 | Status: DISCONTINUED | OUTPATIENT
Start: 2024-05-30 | End: 2024-06-13

## 2024-05-30 NOTE — ASU DISCHARGE PLAN (ADULT/PEDIATRIC) - NS MD DC FALL RISK RISK
For information on Fall & Injury Prevention, visit: [email or web address] OR  [email or web address] OR  https://www.cdc.gov/steadi/patient.html

## 2024-05-30 NOTE — ASU DISCHARGE PLAN (ADULT/PEDIATRIC) - ASU DC SPECIAL INSTRUCTIONSFT
1. come to ENT Office tomorrow at 10:00am for nasal packing removal  office number is [phone]    2. start nasal saline rinse and sprya 4-5 times each for optimal recovery

## 2024-05-30 NOTE — ASU DISCHARGE PLAN (ADULT/PEDIATRIC) - CARE PROVIDER_API CALL
Bryan Simpson  Otolaryngology  [address] [place] [id number]  Phone: [phone]  Fax: [phone]  Follow Up Time:

## 2024-05-31 ENCOUNTER — APPOINTMENT (OUTPATIENT)
Dept: OTOLARYNGOLOGY | Facility: CLINIC | Age: 44
End: 2024-05-31
Payer: COMMERCIAL

## 2024-05-31 PROCEDURE — 99024 POSTOP FOLLOW-UP VISIT: CPT

## 2024-06-05 NOTE — PROCEDURE
[FreeTextEntry3] : Nasal packing removed bilaterally and simultaneously.  Nose suctioned and cleaned. Sprayed nose with decongestant. Pt tolerated well.

## 2024-06-05 NOTE — HISTORY OF PRESENT ILLNESS
[de-identified] :  Pt healing well overnight. Pt has packing in place has dry mouth, tolerating pain.

## 2024-06-06 LAB — SURGICAL PATHOLOGY STUDY: SIGNIFICANT CHANGE UP

## 2024-06-07 ENCOUNTER — APPOINTMENT (OUTPATIENT)
Dept: OTOLARYNGOLOGY | Facility: CLINIC | Age: 44
End: 2024-06-07
Payer: COMMERCIAL

## 2024-06-07 PROBLEM — Z86.39 PERSONAL HISTORY OF OTHER ENDOCRINE, NUTRITIONAL AND METABOLIC DISEASE: Chronic | Status: ACTIVE | Noted: 2024-05-24

## 2024-06-07 PROBLEM — J34.2 DEVIATED NASAL SEPTUM: Chronic | Status: ACTIVE | Noted: 2024-05-24

## 2024-06-07 PROBLEM — R73.03 PREDIABETES: Chronic | Status: ACTIVE | Noted: 2024-05-24

## 2024-06-07 PROBLEM — J34.3 HYPERTROPHY OF NASAL TURBINATES: Chronic | Status: ACTIVE | Noted: 2024-05-24

## 2024-06-07 PROCEDURE — 99024 POSTOP FOLLOW-UP VISIT: CPT

## 2024-06-12 NOTE — HISTORY OF PRESENT ILLNESS
[de-identified] : Pt is healing well. Pt admits to using saline diligently throughout the week. Pt has moderate nasal congestion and mild pain.

## 2024-07-12 ENCOUNTER — APPOINTMENT (OUTPATIENT)
Dept: OTOLARYNGOLOGY | Facility: CLINIC | Age: 44
End: 2024-07-12

## 2024-07-17 ENCOUNTER — APPOINTMENT (OUTPATIENT)
Dept: OTOLARYNGOLOGY | Facility: CLINIC | Age: 44
End: 2024-07-17
Payer: COMMERCIAL

## 2024-07-17 VITALS
OXYGEN SATURATION: 98 % | HEIGHT: 72 IN | SYSTOLIC BLOOD PRESSURE: 101 MMHG | DIASTOLIC BLOOD PRESSURE: 68 MMHG | WEIGHT: 176 LBS | HEART RATE: 64 BPM | BODY MASS INDEX: 23.84 KG/M2

## 2024-07-17 DIAGNOSIS — Z83.511 FAMILY HISTORY OF GLAUCOMA: ICD-10-CM

## 2024-07-17 DIAGNOSIS — J45.909 UNSPECIFIED ASTHMA, UNCOMPLICATED: ICD-10-CM

## 2024-07-17 DIAGNOSIS — J34.2 DEVIATED NASAL SEPTUM: ICD-10-CM

## 2024-07-17 DIAGNOSIS — Z84.89 FAMILY HISTORY OF OTHER SPECIFIED CONDITIONS: ICD-10-CM

## 2024-07-17 DIAGNOSIS — R06.89 OTHER ABNORMALITIES OF BREATHING: ICD-10-CM

## 2024-07-17 PROCEDURE — 31231 NASAL ENDOSCOPY DX: CPT | Mod: 58,52

## 2024-07-17 PROCEDURE — 99214 OFFICE O/P EST MOD 30 MIN: CPT | Mod: 25

## 2024-09-28 ENCOUNTER — NON-APPOINTMENT (OUTPATIENT)
Age: 44
End: 2024-09-28

## 2024-11-03 ENCOUNTER — NON-APPOINTMENT (OUTPATIENT)
Age: 44
End: 2024-11-03

## 2024-12-08 ENCOUNTER — NON-APPOINTMENT (OUTPATIENT)
Age: 44
End: 2024-12-08

## 2025-04-30 ENCOUNTER — APPOINTMENT (OUTPATIENT)
Dept: PULMONOLOGY | Facility: CLINIC | Age: 45
End: 2025-04-30

## 2025-05-07 ENCOUNTER — APPOINTMENT (OUTPATIENT)
Dept: ENDOCRINOLOGY | Facility: CLINIC | Age: 45
End: 2025-05-07
Payer: COMMERCIAL

## 2025-05-07 DIAGNOSIS — R73.03 PREDIABETES.: ICD-10-CM

## 2025-05-07 DIAGNOSIS — R79.89 OTHER SPECIFIED ABNORMAL FINDINGS OF BLOOD CHEMISTRY: ICD-10-CM

## 2025-05-07 DIAGNOSIS — E04.1 NONTOXIC SINGLE THYROID NODULE: ICD-10-CM

## 2025-05-07 DIAGNOSIS — R73.01 IMPAIRED FASTING GLUCOSE: ICD-10-CM

## 2025-05-07 PROCEDURE — 99215 OFFICE O/P EST HI 40 MIN: CPT | Mod: 95

## 2025-05-07 RX ORDER — LANCETS 33 GAUGE
EACH MISCELLANEOUS
Qty: 100 | Refills: 3 | Status: ACTIVE | COMMUNITY
Start: 2025-05-07 | End: 1900-01-01

## 2025-05-07 RX ORDER — ISOPROPYL ALCOHOL 70 ML/100ML
SWAB TOPICAL
Qty: 100 | Refills: 3 | Status: ACTIVE | COMMUNITY
Start: 2025-05-07 | End: 1900-01-01

## 2025-05-07 RX ORDER — BLOOD SUGAR DIAGNOSTIC
STRIP MISCELLANEOUS
Qty: 100 | Refills: 3 | Status: ACTIVE | COMMUNITY
Start: 2025-05-07 | End: 1900-01-01

## 2025-05-07 RX ORDER — BLOOD-GLUCOSE METER
W/DEVICE EACH MISCELLANEOUS
Qty: 1 | Refills: 0 | Status: ACTIVE | COMMUNITY
Start: 2025-05-07 | End: 1900-01-01

## 2025-06-19 ENCOUNTER — APPOINTMENT (OUTPATIENT)
Dept: PULMONOLOGY | Facility: CLINIC | Age: 45
End: 2025-06-19
Payer: COMMERCIAL

## 2025-06-19 VITALS
HEIGHT: 72 IN | TEMPERATURE: 97.7 F | OXYGEN SATURATION: 98 % | DIASTOLIC BLOOD PRESSURE: 64 MMHG | BODY MASS INDEX: 24.11 KG/M2 | WEIGHT: 178 LBS | RESPIRATION RATE: 16 BRPM | SYSTOLIC BLOOD PRESSURE: 120 MMHG | HEART RATE: 85 BPM

## 2025-06-19 VITALS — HEIGHT: 72.5 IN | BODY MASS INDEX: 23.81 KG/M2

## 2025-06-19 PROCEDURE — 94729 DIFFUSING CAPACITY: CPT

## 2025-06-19 PROCEDURE — 94060 EVALUATION OF WHEEZING: CPT

## 2025-06-19 PROCEDURE — 94726 PLETHYSMOGRAPHY LUNG VOLUMES: CPT

## 2025-06-19 PROCEDURE — ZZZZZ: CPT

## 2025-06-19 PROCEDURE — 99214 OFFICE O/P EST MOD 30 MIN: CPT | Mod: 25

## 2025-07-01 ENCOUNTER — APPOINTMENT (OUTPATIENT)
Dept: CT IMAGING | Facility: CLINIC | Age: 45
End: 2025-07-01

## 2025-07-08 ENCOUNTER — APPOINTMENT (OUTPATIENT)
Dept: CT IMAGING | Facility: CLINIC | Age: 45
End: 2025-07-08

## 2025-07-10 NOTE — H&P PST ADULT - NSALCOHOLAMT_GEN_A_CORE_SD
[FreeTextEntry1] : EKG shows normal sinus rhythm rate equals 57 ;with right bundle branch block pattern;  Nonspecific anteroseptal T wave inversions;   In summary, this 65-year-old gentleman has had a history for occasional symptomatic PACs in the past --but lately appears improved.  He does admit to occasional exertional dyspnea, but no PND or orthopnea;  Recent cardiovascular testing including echocardiogram and carotid duplex study appears stable;  He would like to continue doing a moderate amount of physical activity and increase his exercise tolerance but concerned about developing cardiovascular disease;  Presently, hemodynamically stable;    Plan:   Have recommended patient schedule nuclear stress test sometime in the fall to rule out any significant CAD or ischemia.; Patient encouraged to maintain good p.o. hydration especially in the warmer months ahead;  Patient to report any additional untoward chest symptoms between now and stress test;  Follow up to office within 5-6 months or p.r.n.;  Recommend timely checkups and laboratory blood tests with primary care encouraged;
1-2 drinks

## 2025-07-11 ENCOUNTER — OUTPATIENT (OUTPATIENT)
Dept: OUTPATIENT SERVICES | Facility: HOSPITAL | Age: 45
LOS: 1 days | End: 2025-07-11
Payer: COMMERCIAL

## 2025-07-11 ENCOUNTER — APPOINTMENT (OUTPATIENT)
Dept: CT IMAGING | Facility: CLINIC | Age: 45
End: 2025-07-11
Payer: COMMERCIAL

## 2025-07-11 ENCOUNTER — APPOINTMENT (OUTPATIENT)
Dept: ULTRASOUND IMAGING | Facility: CLINIC | Age: 45
End: 2025-07-11

## 2025-07-11 DIAGNOSIS — E04.1 NONTOXIC SINGLE THYROID NODULE: ICD-10-CM

## 2025-07-11 DIAGNOSIS — J45.909 UNSPECIFIED ASTHMA, UNCOMPLICATED: ICD-10-CM

## 2025-07-11 DIAGNOSIS — R94.2 ABNORMAL RESULTS OF PULMONARY FUNCTION STUDIES: ICD-10-CM

## 2025-07-11 PROCEDURE — 76536 US EXAM OF HEAD AND NECK: CPT

## 2025-07-11 PROCEDURE — 71250 CT THORAX DX C-: CPT | Mod: 26

## 2025-07-11 PROCEDURE — 71250 CT THORAX DX C-: CPT

## 2025-07-11 PROCEDURE — 76536 US EXAM OF HEAD AND NECK: CPT | Mod: 26

## 2025-07-18 ENCOUNTER — OUTPATIENT (OUTPATIENT)
Dept: OUTPATIENT SERVICES | Facility: HOSPITAL | Age: 45
LOS: 1 days | End: 2025-07-18
Payer: COMMERCIAL

## 2025-07-18 ENCOUNTER — APPOINTMENT (OUTPATIENT)
Dept: RADIOLOGY | Facility: HOSPITAL | Age: 45
End: 2025-07-18
Payer: COMMERCIAL

## 2025-07-18 DIAGNOSIS — R94.2 ABNORMAL RESULTS OF PULMONARY FUNCTION STUDIES: ICD-10-CM

## 2025-07-18 DIAGNOSIS — J45.909 UNSPECIFIED ASTHMA, UNCOMPLICATED: ICD-10-CM

## 2025-07-18 DIAGNOSIS — Z87.81 PERSONAL HISTORY OF (HEALED) TRAUMATIC FRACTURE: Chronic | ICD-10-CM

## 2025-07-18 DIAGNOSIS — Z00.00 ENCOUNTER FOR GENERAL ADULT MEDICAL EXAMINATION WITHOUT ABNORMAL FINDINGS: ICD-10-CM

## 2025-07-18 PROCEDURE — 76000 FLUOROSCOPY <1 HR PHYS/QHP: CPT | Mod: 26

## 2025-07-18 PROCEDURE — 76000 FLUOROSCOPY <1 HR PHYS/QHP: CPT

## 2025-07-21 ENCOUNTER — NON-APPOINTMENT (OUTPATIENT)
Age: 45
End: 2025-07-21

## 2025-07-21 DIAGNOSIS — M48.9 SPONDYLOPATHY, UNSPECIFIED: ICD-10-CM

## 2025-07-21 DIAGNOSIS — J98.6 DISORDERS OF DIAPHRAGM: ICD-10-CM

## (undated) DEVICE — DRSG MEROCEL 2000 WITH STRING 8CM

## (undated) DEVICE — VENODYNE/SCD SLEEVE CALF MEDIUM

## (undated) DEVICE — GLV 7 PROTEXIS (WHITE)

## (undated) DEVICE — POSITIONER FOAM EGG CRATE ULNAR 2PCS (PINK)

## (undated) DEVICE — Device

## (undated) DEVICE — ELCTR GROUNDING PAD PEDS COVIDIEN

## (undated) DEVICE — DRSG TELFA 3 X 8

## (undated) DEVICE — SUT SILK 3-0 18" FS-1

## (undated) DEVICE — DRSG SPLINT NASAL THERMA MED

## (undated) DEVICE — POSITIONER PATIENT SAFETY STRAP 3X60"

## (undated) DEVICE — SOL IRR POUR NS 0.9% 500ML

## (undated) DEVICE — SUT CHROMIC 4-0 18" G-2

## (undated) DEVICE — WARMING BLANKET UNDERBODY PEDS 36 X 33"

## (undated) DEVICE — PACK SMR

## (undated) DEVICE — VISITEC 4X4

## (undated) DEVICE — LABELS BLANK W PEN

## (undated) DEVICE — STRYKER 4-PORT MANIFOLD W/SPECIMEN COLLECTION

## (undated) DEVICE — DRSG SPLINT INTRA NASAL .5MM OVERSIZE THICK

## (undated) DEVICE — WARMING BLANKET LOWER ADULT

## (undated) DEVICE — SUT ETHILON 3-0 18" FS-1

## (undated) DEVICE — MARKING PEN W RULER

## (undated) DEVICE — ELCTR ROCKER SWITCH PENCIL BLUE 10FT

## (undated) DEVICE — DRSG MASTISOL

## (undated) DEVICE — PACKING GAUZE PLAIN 0.5"

## (undated) DEVICE — CATH IV SAFE INSYTE 14G X 1.75" (ORANGE)

## (undated) DEVICE — SYR LUER LOK 5CC

## (undated) DEVICE — DRSG TAPE MEDIPORE 1"

## (undated) DEVICE — PREP BETADINE KIT

## (undated) DEVICE — SUT PLAIN GUT 4-0 18" SC-1

## (undated) DEVICE — GLV 7.5 PROTEXIS (WHITE)

## (undated) DEVICE — ELCTR GROUNDING PAD ADULT COVIDIEN

## (undated) DEVICE — DRSG STERISTRIPS 0.5 X 4"